# Patient Record
Sex: MALE | Race: ASIAN | NOT HISPANIC OR LATINO | URBAN - METROPOLITAN AREA
[De-identification: names, ages, dates, MRNs, and addresses within clinical notes are randomized per-mention and may not be internally consistent; named-entity substitution may affect disease eponyms.]

---

## 2023-01-19 ENCOUNTER — OFFICE VISIT (OUTPATIENT)
Dept: FAMILY MEDICINE | Facility: CLINIC | Age: 33
End: 2023-01-19
Payer: COMMERCIAL

## 2023-01-19 ENCOUNTER — TELEPHONE (OUTPATIENT)
Dept: FAMILY MEDICINE | Facility: CLINIC | Age: 33
End: 2023-01-19

## 2023-01-19 VITALS
SYSTOLIC BLOOD PRESSURE: 129 MMHG | BODY MASS INDEX: 25.18 KG/M2 | WEIGHT: 190 LBS | DIASTOLIC BLOOD PRESSURE: 89 MMHG | HEART RATE: 96 BPM | TEMPERATURE: 97.8 F | HEIGHT: 73 IN | OXYGEN SATURATION: 98 % | RESPIRATION RATE: 16 BRPM

## 2023-01-19 DIAGNOSIS — Z13.220 LIPID SCREENING: ICD-10-CM

## 2023-01-19 DIAGNOSIS — K21.9 GASTROESOPHAGEAL REFLUX DISEASE WITHOUT ESOPHAGITIS: ICD-10-CM

## 2023-01-19 DIAGNOSIS — K21.9 GASTROESOPHAGEAL REFLUX DISEASE WITHOUT ESOPHAGITIS: Primary | ICD-10-CM

## 2023-01-19 DIAGNOSIS — Z00.00 ROUTINE GENERAL MEDICAL EXAMINATION AT A HEALTH CARE FACILITY: Primary | ICD-10-CM

## 2023-01-19 DIAGNOSIS — Z13.29 SCREENING FOR THYROID DISORDER: ICD-10-CM

## 2023-01-19 PROCEDURE — 99385 PREV VISIT NEW AGE 18-39: CPT | Performed by: PHYSICIAN ASSISTANT

## 2023-01-19 PROCEDURE — 99213 OFFICE O/P EST LOW 20 MIN: CPT | Mod: 25 | Performed by: PHYSICIAN ASSISTANT

## 2023-01-19 RX ORDER — PANTOPRAZOLE SODIUM 40 MG/1
40 TABLET, DELAYED RELEASE ORAL DAILY
Qty: 30 TABLET | Refills: 1 | Status: SHIPPED | OUTPATIENT
Start: 2023-01-19 | End: 2023-01-20

## 2023-01-19 ASSESSMENT — ENCOUNTER SYMPTOMS
WEAKNESS: 0
DIARRHEA: 0
HEADACHES: 0
HEARTBURN: 1
ABDOMINAL PAIN: 0
FREQUENCY: 0
NERVOUS/ANXIOUS: 0
DIZZINESS: 0
NAUSEA: 0
DYSURIA: 0
COUGH: 0
HEMATURIA: 0
PALPITATIONS: 0
HEMATOCHEZIA: 1
JOINT SWELLING: 0
CONSTIPATION: 0
FEVER: 0
SORE THROAT: 0
ARTHRALGIAS: 0
SHORTNESS OF BREATH: 0
MYALGIAS: 0
PARESTHESIAS: 0
CHILLS: 0
EYE PAIN: 0

## 2023-01-19 ASSESSMENT — PAIN SCALES - GENERAL: PAINLEVEL: NO PAIN (0)

## 2023-01-19 NOTE — PROGRESS NOTES
SUBJECTIVE:   CC: Zeina is an 32 year old who presents for preventative health visit.     ASSESSMENT / PLAN:  (Z00.00) Routine general medical examination at a health care facility  (primary encounter diagnosis)  Comment: Completed  Plan: Continue following up yearly    (K21.9) Gastroesophageal reflux disease without esophagitis  Comment: New problem  Plan: pantoprazole (PROTONIX) 40 MG EC tablet   Increased discomfort after eating in stomach and chest which is worsened by spicy foods and when sedentary is consistent with reflux. Will treat with protonix and if not resolving will consider further testing               Patient has been advised of split billing requirements and indicates understanding: Yes  Healthy Habits:     Getting at least 3 servings of Calcium per day:  Yes    Bi-annual eye exam:  NO    Dental care twice a year:  NO    Sleep apnea or symptoms of sleep apnea:  Excessive snoring and Sleep apnea    Diet:  Regular (no restrictions)    Frequency of exercise:  None    Taking medications regularly:  Yes    Medication side effects:  Not applicable    PHQ-2 Total Score: 0    Additional concerns today:  Yes        Today's PHQ-2 Score:   PHQ-2 ( 1999 Pfizer) 1/19/2023   Q1: Little interest or pleasure in doing things 0   Q2: Feeling down, depressed or hopeless 0   PHQ-2 Score 0   Q1: Little interest or pleasure in doing things Not at all   Q2: Feeling down, depressed or hopeless Not at all   PHQ-2 Score 0       Have you ever done Advance Care Planning? (For example, a Health Directive, POLST, or a discussion with a medical provider or your loved ones about your wishes): No, advance care planning information given to patient to review.  Patient declined advance care planning discussion at this time.    Social History     Tobacco Use     Smoking status: Some Days     Types: Cigarettes     Smokeless tobacco: Never   Substance Use Topics     Alcohol use: Not on file     If you drink alcohol do you typically have  >3 drinks per day or >7 drinks per week? No    Alcohol Use 1/19/2023   Prescreen: >3 drinks/day or >7 drinks/week? No   No flowsheet data found.    Last PSA: No results found for: PSA    Reviewed orders with patient. Reviewed health maintenance and updated orders accordingly - Yes  Lab work is in process    Reviewed and updated as needed this visit by clinical staff   Tobacco  Allergies  Meds              Reviewed and updated as needed this visit by Provider                 History reviewed. No pertinent past medical history.   History reviewed. No pertinent surgical history.    Review of Systems   Constitutional: Negative for chills and fever.   HENT: Negative for congestion, ear pain, hearing loss and sore throat.    Eyes: Negative for pain and visual disturbance.   Respiratory: Negative for cough and shortness of breath.    Cardiovascular: Positive for chest pain. Negative for palpitations and peripheral edema.   Gastrointestinal: Positive for heartburn and hematochezia. Negative for abdominal pain, constipation, diarrhea and nausea.   Genitourinary: Negative for dysuria, frequency, genital sores, hematuria and urgency.   Musculoskeletal: Negative for arthralgias, joint swelling and myalgias.   Skin: Negative for rash.   Neurological: Negative for dizziness, weakness, headaches and paresthesias.   Psychiatric/Behavioral: Negative for mood changes. The patient is not nervous/anxious.      CONSTITUTIONAL: NEGATIVE for fever, chills, change in weight  INTEGUMENTARY/SKIN: NEGATIVE for worrisome rashes, moles or lesions  EYES: NEGATIVE for vision changes or irritation  ENT: NEGATIVE for ear, mouth and throat problems  RESP: NEGATIVE for significant cough or SOB  CV: NEGATIVE for chest pain, palpitations or peripheral edema  GI: NEGATIVE for nausea, abdominal pain, heartburn, or change in bowel habits   male: negative for dysuria, hematuria, decreased urinary stream, erectile dysfunction, urethral  "discharge  MUSCULOSKELETAL: NEGATIVE for significant arthralgias or myalgia  NEURO: NEGATIVE for weakness, dizziness or paresthesias  PSYCHIATRIC: NEGATIVE for changes in mood or affect    OBJECTIVE:   /89   Pulse 96   Temp 97.8  F (36.6  C) (Tympanic)   Resp 16   Ht 1.842 m (6' 0.5\")   Wt 86.2 kg (190 lb)   SpO2 98%   BMI 25.41 kg/m      Physical Exam  GENERAL: healthy, alert and no distress  EYES: Eyes grossly normal to inspection, PERRL and conjunctivae and sclerae normal  HENT: ear canals and TM's normal, nose and mouth without ulcers or lesions  NECK: no adenopathy, no asymmetry, masses, or scars and thyroid normal to palpation  RESP: lungs clear to auscultation - no rales, rhonchi or wheezes  CV: regular rate and rhythm, normal S1 S2, no S3 or S4, no murmur, click or rub, no peripheral edema and peripheral pulses strong  ABDOMEN: soft, nontender, no hepatosplenomegaly, no masses and bowel sounds normal          COUNSELING:   Reviewed preventive health counseling, as reflected in patient instructions       Regular exercise       Healthy diet/nutrition        He reports that he has been smoking cigarettes. He has never used smokeless tobacco.            CHANTELL MERRILL [facility]  "

## 2023-01-19 NOTE — TELEPHONE ENCOUNTER
Patient is calling to the clinic in regards to prescription below.      Disp Refills Start End ANGÉLICA   pantoprazole (PROTONIX) 40 MG EC tablet 30 tablet 1 1/19/2023  --   Sig - Route: Take 1 tablet (40 mg) by mouth daily - Oral   Sent to pharmacy as: Pantoprazole Sodium 40 MG Oral Tablet Delayed Release (PROTONIX)   Class: E-Prescribe   Order: [id number]   E-Prescribing Status: Receipt confirmed by pharmacy (1/19/2023 11:17 AM CST)     Patient states that he went to go  the prescription at the pharmacy and it was not covered.     Patient is wondering if an alternative medication can be sent.     Routing to provider to review and advise.     Lawanda Avalos RN, BSN  [facility]

## 2023-01-19 NOTE — NURSING NOTE
"Chief Complaint   Patient presents with     Physical       Initial /89   Pulse 96   Temp 97.8  F (36.6  C) (Tympanic)   Resp 16   Ht 1.842 m (6' 0.5\")   Wt 86.2 kg (190 lb)   SpO2 98%   BMI 25.41 kg/m   Estimated body mass index is 25.41 kg/m  as calculated from the following:    Height as of this encounter: 1.842 m (6' 0.5\").    Weight as of this encounter: 86.2 kg (190 lb).  Medication Reconciliation: complete    CANDACE Randle MA    "

## 2023-01-20 RX ORDER — OMEPRAZOLE 40 MG/1
40 CAPSULE, DELAYED RELEASE ORAL DAILY
Qty: 30 CAPSULE | Refills: 1 | Status: SHIPPED | OUTPATIENT
Start: 2023-01-20 | End: 2024-04-03

## 2023-01-20 NOTE — TELEPHONE ENCOUNTER
I sent in omeprazole which may be covered better. Still the same directions as before as a once daily medication    Shilo Kapadia, PAC

## 2023-01-20 NOTE — TELEPHONE ENCOUNTER
Pt notified of provider message as written.  Pt verbalized good understanding.  Hanna Hall BSN, RN

## 2023-01-26 ENCOUNTER — LAB (OUTPATIENT)
Dept: LAB | Facility: CLINIC | Age: 33
End: 2023-01-26
Payer: COMMERCIAL

## 2023-01-26 DIAGNOSIS — Z11.59 NEED FOR HEPATITIS C SCREENING TEST: ICD-10-CM

## 2023-01-26 DIAGNOSIS — Z11.4 SCREENING FOR HIV (HUMAN IMMUNODEFICIENCY VIRUS): ICD-10-CM

## 2023-01-26 DIAGNOSIS — Z13.220 LIPID SCREENING: ICD-10-CM

## 2023-01-26 DIAGNOSIS — Z00.00 ROUTINE GENERAL MEDICAL EXAMINATION AT A HEALTH CARE FACILITY: ICD-10-CM

## 2023-01-26 DIAGNOSIS — Z13.29 SCREENING FOR THYROID DISORDER: ICD-10-CM

## 2023-01-26 LAB
ANION GAP SERPL CALCULATED.3IONS-SCNC: 3 MMOL/L (ref 3–14)
BASOPHILS # BLD AUTO: 0 10E3/UL (ref 0–0.2)
BASOPHILS NFR BLD AUTO: 1 %
BUN SERPL-MCNC: 14 MG/DL (ref 7–30)
CALCIUM SERPL-MCNC: 9.2 MG/DL (ref 8.5–10.1)
CHLORIDE BLD-SCNC: 110 MMOL/L (ref 94–109)
CHOLEST SERPL-MCNC: 196 MG/DL
CO2 SERPL-SCNC: 29 MMOL/L (ref 20–32)
CREAT SERPL-MCNC: 0.77 MG/DL (ref 0.66–1.25)
EOSINOPHIL # BLD AUTO: 0.1 10E3/UL (ref 0–0.7)
EOSINOPHIL NFR BLD AUTO: 2 %
ERYTHROCYTE [DISTWIDTH] IN BLOOD BY AUTOMATED COUNT: 12.2 % (ref 10–15)
FASTING STATUS PATIENT QL REPORTED: YES
GFR SERPL CREATININE-BSD FRML MDRD: >90 ML/MIN/1.73M2
GLUCOSE BLD-MCNC: 108 MG/DL (ref 70–99)
HCT VFR BLD AUTO: 45.4 % (ref 40–53)
HDLC SERPL-MCNC: 57 MG/DL
HGB BLD-MCNC: 15.3 G/DL (ref 13.3–17.7)
LDLC SERPL CALC-MCNC: 118 MG/DL
LYMPHOCYTES # BLD AUTO: 2.6 10E3/UL (ref 0.8–5.3)
LYMPHOCYTES NFR BLD AUTO: 42 %
MCH RBC QN AUTO: 31.4 PG (ref 26.5–33)
MCHC RBC AUTO-ENTMCNC: 33.7 G/DL (ref 31.5–36.5)
MCV RBC AUTO: 93 FL (ref 78–100)
MONOCYTES # BLD AUTO: 0.5 10E3/UL (ref 0–1.3)
MONOCYTES NFR BLD AUTO: 8 %
NEUTROPHILS # BLD AUTO: 2.9 10E3/UL (ref 1.6–8.3)
NEUTROPHILS NFR BLD AUTO: 48 %
NONHDLC SERPL-MCNC: 139 MG/DL
PLATELET # BLD AUTO: 240 10E3/UL (ref 150–450)
POTASSIUM BLD-SCNC: 3.9 MMOL/L (ref 3.4–5.3)
RBC # BLD AUTO: 4.88 10E6/UL (ref 4.4–5.9)
SODIUM SERPL-SCNC: 142 MMOL/L (ref 133–144)
TRIGL SERPL-MCNC: 105 MG/DL
TSH SERPL DL<=0.005 MIU/L-ACNC: 0.83 MU/L (ref 0.4–4)
WBC # BLD AUTO: 6 10E3/UL (ref 4–11)

## 2023-01-26 PROCEDURE — 80061 LIPID PANEL: CPT

## 2023-01-26 PROCEDURE — 80048 BASIC METABOLIC PNL TOTAL CA: CPT

## 2023-01-26 PROCEDURE — 87389 HIV-1 AG W/HIV-1&-2 AB AG IA: CPT

## 2023-01-26 PROCEDURE — 85025 COMPLETE CBC W/AUTO DIFF WBC: CPT

## 2023-01-26 PROCEDURE — 84443 ASSAY THYROID STIM HORMONE: CPT

## 2023-01-26 PROCEDURE — 86803 HEPATITIS C AB TEST: CPT

## 2023-01-26 PROCEDURE — 36415 COLL VENOUS BLD VENIPUNCTURE: CPT

## 2023-01-27 LAB
HCV AB SERPL QL IA: NONREACTIVE
HIV 1+2 AB+HIV1 P24 AG SERPL QL IA: NONREACTIVE

## 2023-02-12 ENCOUNTER — HEALTH MAINTENANCE LETTER (OUTPATIENT)
Age: 33
End: 2023-02-12

## 2024-01-04 ENCOUNTER — PATIENT OUTREACH (OUTPATIENT)
Dept: CARE COORDINATION | Facility: CLINIC | Age: 34
End: 2024-01-04
Payer: COMMERCIAL

## 2024-01-18 ENCOUNTER — PATIENT OUTREACH (OUTPATIENT)
Dept: CARE COORDINATION | Facility: CLINIC | Age: 34
End: 2024-01-18
Payer: COMMERCIAL

## 2024-01-22 ENCOUNTER — OFFICE VISIT (OUTPATIENT)
Dept: FAMILY MEDICINE | Facility: CLINIC | Age: 34
End: 2024-01-22
Payer: COMMERCIAL

## 2024-01-22 ENCOUNTER — ANCILLARY PROCEDURE (OUTPATIENT)
Dept: GENERAL RADIOLOGY | Facility: CLINIC | Age: 34
End: 2024-01-22
Attending: PHYSICIAN ASSISTANT
Payer: COMMERCIAL

## 2024-01-22 VITALS
HEIGHT: 72 IN | OXYGEN SATURATION: 98 % | DIASTOLIC BLOOD PRESSURE: 72 MMHG | HEART RATE: 71 BPM | TEMPERATURE: 97.7 F | WEIGHT: 171 LBS | BODY MASS INDEX: 23.16 KG/M2 | SYSTOLIC BLOOD PRESSURE: 122 MMHG | RESPIRATION RATE: 16 BRPM

## 2024-01-22 DIAGNOSIS — R22.31 NODULE OF FINGER OF RIGHT HAND: ICD-10-CM

## 2024-01-22 DIAGNOSIS — R14.2 ERUCTATION: Primary | ICD-10-CM

## 2024-01-22 PROCEDURE — 99213 OFFICE O/P EST LOW 20 MIN: CPT | Performed by: PHYSICIAN ASSISTANT

## 2024-01-22 PROCEDURE — 73140 X-RAY EXAM OF FINGER(S): CPT | Mod: TC | Performed by: RADIOLOGY

## 2024-01-22 ASSESSMENT — ENCOUNTER SYMPTOMS
PARESTHESIAS: 0
HEADACHES: 0
SHORTNESS OF BREATH: 0
FEVER: 0
COUGH: 0
CONSTIPATION: 0
ABDOMINAL PAIN: 0
HEARTBURN: 1
HEMATURIA: 0
FREQUENCY: 0
MYALGIAS: 0
WEAKNESS: 0
DIARRHEA: 0
JOINT SWELLING: 0
HEMATOCHEZIA: 0
EYE PAIN: 0
DYSURIA: 0
NAUSEA: 0
NERVOUS/ANXIOUS: 0
SORE THROAT: 0
CHILLS: 0
ARTHRALGIAS: 0
DIZZINESS: 0
PALPITATIONS: 0

## 2024-01-22 ASSESSMENT — PAIN SCALES - GENERAL: PAINLEVEL: NO PAIN (1)

## 2024-01-22 NOTE — PROGRESS NOTES
"Preventive Care Visit  [facility]  YOEL MCDERMOTT PA-C, Physician Assistant - Medical  Jan 22, 2024      SUBJECTIVE:   eZina is a 33 year old, presenting for the following:  Physical (Discuss Stomach issues, and knot on right hand finger )        1/22/2024     1:51 PM   Additional Questions   Roomed by Anita   Accompanied by n/a     Zeina is an extremely pleasant 33 year old male who presents to clinic for evaluation of a couple issues.    1)  He has been having trouble with excessive burping and sour taste after the burp since before his check up last year. He was seen by [facility] and had some blood, stool testing that was normal. He was given a pill to try. The pill did decrease the sourness, but not the burping. He reports that the burping is not as bothersome to him as it is to his wife and other family members. When he eats, he feels like something is getting stuck at times, then feels a lot of air build up and then needs to burp. He describes it as \"really loud.\"  When he feels that something might be stuck, he stands up and drinks something to get it to move. Most of the time, it is meat. Once he felt like he was choking.     2) The other issue is that he has a bump on the palmar aspect of his right ring finger. It has been there for a couple years, has not grown and is not especially painful....unless he is grasping something. Then, it is described as \"bothersome, but not painful.\" It does, however, cause him to drop things at times because it takes him by surprise. He does work with his fingers a lot, rolling sushi.     He has no other concerns for today's visit. He is not interested in any vaccines today and he does not need any labs drawn.     Healthy Habits:     Getting at least 3 servings of Calcium per day:  Yes    Bi-annual eye exam:  NO    Dental care twice a year:  NO    Sleep apnea or symptoms of sleep apnea:  Excessive snoring    Diet:  Regular (no restrictions)    Frequency of " "exercise:  None    Taking medications regularly:  Yes    Medication side effects:  None    Additional concerns today:  Yes      Today's PHQ-2 Score:       1/22/2024     1:46 PM   PHQ-2 ( 1999 Pfizer)   Q1: Little interest or pleasure in doing things 1   Q2: Feeling down, depressed or hopeless 0   PHQ-2 Score 1   Q1: Little interest or pleasure in doing things Several days   Q2: Feeling down, depressed or hopeless Not at all   PHQ-2 Score 1       Social History     Tobacco Use    Smoking status: Some Days     Types: Cigarettes    Smokeless tobacco: Never   Substance Use Topics    Alcohol use: Not on file             1/22/2024     1:45 PM   Alcohol Use   Prescreen: >3 drinks/day or >7 drinks/week? No       Last PSA: No results found for: \"PSA\"    Reviewed orders with patient. Reviewed health maintenance and updated orders accordingly - Yes    Reviewed and updated as needed this visit by clinical staff   Tobacco  Allergies               Reviewed and updated as needed this visit by Provider                    Review of Systems   Constitutional:  Negative for chills and fever.   HENT:  Negative for congestion, ear pain, hearing loss and sore throat.    Eyes:  Negative for pain and visual disturbance.   Respiratory:  Negative for cough and shortness of breath.    Cardiovascular:  Negative for chest pain and palpitations.   Gastrointestinal:  Negative for abdominal pain, constipation, diarrhea and nausea.   Genitourinary:  Negative for dysuria, frequency, genital sores, hematuria and urgency.   Musculoskeletal:  Negative for arthralgias, joint swelling and myalgias.   Skin:  Negative for rash.   Neurological:  Negative for dizziness, weakness and headaches.   Psychiatric/Behavioral:  The patient is not nervous/anxious.          OBJECTIVE:   /72   Pulse 71   Temp 97.7  F (36.5  C) (Tympanic)   Resp 16   Ht 1.816 m (5' 11.5\")   Wt 77.6 kg (171 lb)   SpO2 98%   BMI 23.52 kg/m     Estimated body mass index is " "23.52 kg/m  as calculated from the following:    Height as of this encounter: 1.816 m (5' 11.5\").    Weight as of this encounter: 77.6 kg (171 lb).  Physical Exam  Constitutional:       Appearance: Normal appearance. He is normal weight. He is not ill-appearing.   HENT:      Mouth/Throat:      Mouth: Mucous membranes are moist.      Pharynx: No oropharyngeal exudate or posterior oropharyngeal erythema.   Cardiovascular:      Rate and Rhythm: Normal rate and regular rhythm.      Heart sounds: No murmur heard.  Pulmonary:      Effort: Pulmonary effort is normal.      Breath sounds: Normal breath sounds.   Abdominal:      General: Abdomen is flat. Bowel sounds are normal. There is no distension.      Palpations: Abdomen is soft. There is no mass.      Tenderness: There is no abdominal tenderness. There is no guarding or rebound.      Hernia: No hernia is present.   Musculoskeletal:      Cervical back: Normal range of motion. No rigidity or tenderness.   Lymphadenopathy:      Cervical: No cervical adenopathy.   Skin:     General: Skin is warm and dry.   Neurological:      Mental Status: He is alert and oriented to person, place, and time.   Psychiatric:         Mood and Affect: Mood normal.         Behavior: Behavior normal.         Thought Content: Thought content normal.         Judgment: Judgment normal.         ASSESSMENT/PLAN:   Eructation  Ddx includes but not limited to: esophageal stricture, hiatal hernia, chewing/swallowing mechanism dysfunction.   ---recommend returning to GI for further evaluation, possible EGD, possible swallow study, possible referral to SLP for swallow treatment. He is not ready to do this now due to scheduling issues. He will think about how he would like to proceed and let me know. In the interim, I have asked him to count to 20 while chewing and get all 20 chews in before swallowing to see if he can cut back on the symptoms. He is no longer taking a PPI and doing well without it for now " on the sour taste.     Nodule of finger of right hand  Likely a ganglion cyst. Discussed that there is a possible surgical intervention that might be helpful but often the lump will recur in time. I did do x-rays to verify there is not a bone problem or more sinister issue. I will send referral to hand surgeon for evaluation and treatment.   - XR Finger Right G/E 2 Views      He reports that he has been smoking cigarettes. He has never used smokeless tobacco.  Nicotine/Tobacco Cessation Plan  Not ready to quit yet. Smokes socially.             Signed Electronically by: YOEL MCDERMOTT PA-C  Answers submitted by the patient for this visit:  Annual Preventive Visit (Submitted on 1/22/2024)  Chief Complaint: Annual Exam:  Blood in stool: No  heartburn: Yes  peripheral edema: No  mood changes: No  Skin sensation changes: No

## 2024-01-24 ENCOUNTER — TELEPHONE (OUTPATIENT)
Dept: FAMILY MEDICINE | Facility: CLINIC | Age: 34
End: 2024-01-24
Payer: COMMERCIAL

## 2024-01-24 DIAGNOSIS — R22.31 NODULE OF FINGER OF RIGHT HAND: Primary | ICD-10-CM

## 2024-01-24 NOTE — TELEPHONE ENCOUNTER
Order/Referral Request    Who is requesting: Patient    Orders being requested: Referral for hand specialist    Reason service is needed/diagnosis: finger injury    When are orders needed by: asap    Has this been discussed with Provider: Yes    Does patient have a preference on a Group/Provider/Facility? Within [facility]  Does patient have an appointment scheduled?: No    Where to send orders: Place orders within Epic    Could we send this information to you in [facility] or would you prefer to receive a phone call?:   Patient would prefer a phone call   Okay to leave a detailed message?: Yes at Cell number on file:    Telephone Information:   Mobile [phone]

## 2024-03-01 ENCOUNTER — OFFICE VISIT (OUTPATIENT)
Dept: FAMILY MEDICINE | Facility: CLINIC | Age: 34
End: 2024-03-01
Payer: COMMERCIAL

## 2024-03-01 VITALS
WEIGHT: 168 LBS | TEMPERATURE: 98.7 F | RESPIRATION RATE: 12 BRPM | OXYGEN SATURATION: 97 % | BODY MASS INDEX: 22.75 KG/M2 | HEART RATE: 75 BPM | HEIGHT: 72 IN | DIASTOLIC BLOOD PRESSURE: 83 MMHG | SYSTOLIC BLOOD PRESSURE: 135 MMHG

## 2024-03-01 DIAGNOSIS — L65.9 ALOPECIA: Primary | ICD-10-CM

## 2024-03-01 LAB
BASOPHILS # BLD AUTO: 0 10E3/UL (ref 0–0.2)
BASOPHILS NFR BLD AUTO: 1 %
EOSINOPHIL # BLD AUTO: 0.1 10E3/UL (ref 0–0.7)
EOSINOPHIL NFR BLD AUTO: 1 %
ERYTHROCYTE [DISTWIDTH] IN BLOOD BY AUTOMATED COUNT: 11.8 % (ref 10–15)
HCT VFR BLD AUTO: 45.3 % (ref 40–53)
HGB BLD-MCNC: 14.9 G/DL (ref 13.3–17.7)
IMM GRANULOCYTES # BLD: 0 10E3/UL
IMM GRANULOCYTES NFR BLD: 0 %
LYMPHOCYTES # BLD AUTO: 2.3 10E3/UL (ref 0.8–5.3)
LYMPHOCYTES NFR BLD AUTO: 37 %
MCH RBC QN AUTO: 31.3 PG (ref 26.5–33)
MCHC RBC AUTO-ENTMCNC: 32.9 G/DL (ref 31.5–36.5)
MCV RBC AUTO: 95 FL (ref 78–100)
MONOCYTES # BLD AUTO: 0.4 10E3/UL (ref 0–1.3)
MONOCYTES NFR BLD AUTO: 6 %
NEUTROPHILS # BLD AUTO: 3.4 10E3/UL (ref 1.6–8.3)
NEUTROPHILS NFR BLD AUTO: 55 %
PLATELET # BLD AUTO: 234 10E3/UL (ref 150–450)
RBC # BLD AUTO: 4.76 10E6/UL (ref 4.4–5.9)
WBC # BLD AUTO: 6.3 10E3/UL (ref 4–11)

## 2024-03-01 PROCEDURE — 83540 ASSAY OF IRON: CPT | Performed by: PHYSICIAN ASSISTANT

## 2024-03-01 PROCEDURE — 86780 TREPONEMA PALLIDUM: CPT | Performed by: PHYSICIAN ASSISTANT

## 2024-03-01 PROCEDURE — 82728 ASSAY OF FERRITIN: CPT | Performed by: PHYSICIAN ASSISTANT

## 2024-03-01 PROCEDURE — 84443 ASSAY THYROID STIM HORMONE: CPT | Performed by: PHYSICIAN ASSISTANT

## 2024-03-01 PROCEDURE — 85025 COMPLETE CBC W/AUTO DIFF WBC: CPT | Performed by: PHYSICIAN ASSISTANT

## 2024-03-01 PROCEDURE — 36415 COLL VENOUS BLD VENIPUNCTURE: CPT | Performed by: PHYSICIAN ASSISTANT

## 2024-03-01 PROCEDURE — 99213 OFFICE O/P EST LOW 20 MIN: CPT | Performed by: PHYSICIAN ASSISTANT

## 2024-03-01 PROCEDURE — 83550 IRON BINDING TEST: CPT | Performed by: PHYSICIAN ASSISTANT

## 2024-03-01 NOTE — PROGRESS NOTES
"  Assessment & Plan     Alopecia  Will run some lab tests, but sent referral to Derm for further evaluation.   - Adult Dermatology  Referral; Future  - CBC with platelets and differential; Future  - TSH with free T4 reflex; Future  - Treponema Abs w Reflex to RPR and Titer; Future  - Iron and iron binding capacity; Future  - Ferritin; Future  - CBC with platelets and differential  - TSH with free T4 reflex  - Treponema Abs w Reflex to RPR and Titer  - Iron and iron binding capacity  - Ferritin    Win Pastrana is a 33 year old, presenting for the following health issues:  bald spots (Per pt it has been expanding quickly. )        3/1/2024     2:41 PM   Additional Questions   Roomed by Anita   Accompanied by Self         3/1/2024     2:41 PM   Patient Reported Additional Medications   Patient reports taking the following new medications n/a     Zeina is a very pleasant 33 year old male who presents to clinic for evaluation of sudden onset bald spots on his head. He says the right one has been expanding for about a year, but the left one just was noticed a month ago. No pain. No changes in products. Wife cuts his hair (and says she did not cut it like that but was the one who noticed it).     History of Present Illness       Reason for visit:  Bald spots on head  Symptom onset:  More than a month    He eats 0-1 servings of fruits and vegetables daily.He consumes 1 sweetened beverage(s) daily.He exercises with enough effort to increase his heart rate 9 or less minutes per day.  He exercises with enough effort to increase his heart rate 3 or less days per week.   He is taking medications regularly.     ROS: see HPI      Objective    /83   Pulse 75   Temp 98.7  F (37.1  C) (Tympanic)   Resp 12   Ht 1.816 m (5' 11.5\")   Wt 76.2 kg (168 lb)   SpO2 97%   BMI 23.10 kg/m    Body mass index is 23.1 kg/m .  Physical Exam   Vitals reviewed  Hair: two large circular bald spots (one on the right posterior " and one on the left) scalp. Appears to have some hair follicles. The one on the right has a small pink central lesion but no evidence of abscess.   Psych: appropriate behavior, mood and thought content.     Signed Electronically by: YOEL MCDERMOTT PA-C

## 2024-03-02 LAB
FERRITIN SERPL-MCNC: 423 NG/ML (ref 31–409)
IRON BINDING CAPACITY (ROCHE): 308 UG/DL (ref 240–430)
IRON SATN MFR SERPL: 35 % (ref 15–46)
IRON SERPL-MCNC: 108 UG/DL (ref 61–157)
T PALLIDUM AB SER QL: NONREACTIVE
TSH SERPL DL<=0.005 MIU/L-ACNC: 1.09 UIU/ML (ref 0.3–4.2)

## 2024-03-07 ENCOUNTER — OFFICE VISIT (OUTPATIENT)
Dept: ORTHOPEDICS | Facility: CLINIC | Age: 34
End: 2024-03-07
Payer: COMMERCIAL

## 2024-03-07 VITALS
HEART RATE: 70 BPM | WEIGHT: 168 LBS | SYSTOLIC BLOOD PRESSURE: 137 MMHG | BODY MASS INDEX: 23.1 KG/M2 | DIASTOLIC BLOOD PRESSURE: 87 MMHG

## 2024-03-07 DIAGNOSIS — R22.31 NODULE OF FINGER OF RIGHT HAND: ICD-10-CM

## 2024-03-07 PROCEDURE — 99243 OFF/OP CNSLTJ NEW/EST LOW 30: CPT | Mod: 25 | Performed by: FAMILY MEDICINE

## 2024-03-07 PROCEDURE — 20604 DRAIN/INJ JOINT/BURSA W/US: CPT | Mod: RT | Performed by: FAMILY MEDICINE

## 2024-03-07 NOTE — LETTER
3/7/2024         RE: Zeina Elaine  [address]  [place] [address]        Dear Colleague,    Thank you for referring your patient, Zeina Elaine, to the [facility]. Please see a copy of my visit note below.    ASSESSMENT & PLAN    Zeina was seen today for pain.    Diagnoses and all orders for this visit:    Nodule of finger of right hand  -     Orthopedic  Referral  -     MR Finger Right w/o & w Contrast; Future      This issue is acute on chronic and Worsening.    # Right 4th Digit Mass: Zeina Elaine  was seen today for right 4th digit mass. Symptoms had been going on for years, with pain affecting his ability to work. On examination there are positive findings of swelling over radial side of right 4th PIP joint with pain with palpation. Imaging findings showed negative right fourth digit finger x-ray, ultrasound showing hypoechoic mass associated with the flexor tendon.  Aspiration attempt today was unsuccessful there is a concern this may be a benign tumor causing his symptoms versus a ganglion cyst.  Given this plan to get right fourth digit MRI and have patient follow-up in clinic afterwards to go over the results.     Image Findings: Negative right fourth digit x-ray, ultrasound showing mass adjacent to the fourth PIP flexor tendon  Treatment: Activities as tolerated, right fourth digit MRI ordered with and without contrast  Medications/Injections: Limited tylenol/ibuprofen for pain for 1-2 weeks, Topical Voltaren gel, unsuccessful aspiration attempt of fourth digit mass  Follow-up: In clinic afterwards to go over the results      Artem Garcia MD  [facility]    -----  Chief Complaint   Patient presents with     Right Ring Finger - Pain       SUBJECTIVE  Zeina Elaine is a/an 33 year old male who is seen in consultation at the request of  Basilia Castellano PA-C for evaluation of right ring finger pain. Reviewed PCP  notes.    The patient is seen by themselves.  The patient is Right handed    Onset: 3-4 years(s) ago. Reports insidious onset without acute precipitating event.  Location of Pain: Right ring finger PIP, volar side   Worsened by: when he squeezes and object   Better with: nothing   Treatments tried: no treatment tried to date  Associated symptoms: bump     Orthopedic/Surgical history: NO  Social History/Occupation:      No family history pertinent to patient's problem today.     REVIEW OF SYSTEMS:  Review of Systems  Constitutional, HEENT, cardiovascular, pulmonary, gi and gu systems are negative, except as otherwise noted.    OBJECTIVE:  /87   Pulse 70   Wt 76.2 kg (168 lb)   BMI 23.10 kg/m     General: healthy, alert and in no distress  HEENT: no scleral icterus or conjunctival erythema  Skin: no suspicious lesions or rash. No jaundice.  CV: distal perfusion intact    Resp: normal respiratory effort without conversational dyspnea   Psych: normal mood and affect  Gait: normal steady gait with appropriate coordination and balance    Neuro: Normal light sensory exam of right upper extremity     Ortho Exam   RIGHT HAND  Inspection:  Swelling over 4th digit volar radial PIP joint  Palpation:   Carpals: normal   Metacarpals: normal   Thumb: normal   Fingers: normal  Range of Motion:    Full active flexion and extension at MCP, PIP, and DIP joints; normal finger cascade without malrotation.  Wrist pronation, supination, and ulnar/radial deviation normal.  Strength:     full     RADIOLOGY:  I independently ordered, visualized and reviewed these images with the patient  Right 4th digit x-rays reviewed      Review of external notes as documented elsewhere in note  Review of the result(s) of each unique test - right 4th digit x-rays       Disclaimer: This note consists of symbols derived from keyboarding, dictation and/or voice recognition software. As a result, there may be errors in the script that have  gone undetected. Please consider this when interpreting information found in this chart.    Small Joint Injection/Arthrocentesis: R ring PIP    Date/Time: 3/14/2024 9:08 AM    Performed by: Artem Garcia MD  Authorized by: Artem Garcia MD  Indications:  Pain  Needle Size:  18 G  Guidance: ultrasound     Approach:  Volar  Location:  Ring finger    Site:  R ring PIP                    Medications:  0.5 mL ROPivacaine 5 MG/ML          Procedure discussed: discussed risks, benefits, and alternatives    Consent Given by:  Patient  Timeout: timeout called immediately prior to procedure    Prep: patient was prepped and draped in usual sterile fashion       Unsuccessful ultrasound-guided aspiration attempt of mass over right fourth PIP joint on the volar side.  Ultrasound guided images were permanently stored.   Aftercare instructions given to patient.  Plan to follow-up as discussed above.     Artem Garcia MD [facility]              Again, thank you for allowing me to participate in the care of your patient.        Sincerely,        Artem Garcia MD

## 2024-03-07 NOTE — PROGRESS NOTES
ASSESSMENT & PLAN    Zeina was seen today for pain.    Diagnoses and all orders for this visit:    Nodule of finger of right hand  -     Orthopedic  Referral  -     MR Finger Right w/o & w Contrast; Future      This issue is acute on chronic and Worsening.    # Right 4th Digit Mass: Zeina Elaine  was seen today for right 4th digit mass. Symptoms had been going on for years, with pain affecting his ability to work. On examination there are positive findings of swelling over radial side of right 4th PIP joint with pain with palpation. Imaging findings showed negative right fourth digit finger x-ray, ultrasound showing hypoechoic mass associated with the flexor tendon.  Aspiration attempt today was unsuccessful there is a concern this may be a benign tumor causing his symptoms versus a ganglion cyst.  Given this plan to get right fourth digit MRI and have patient follow-up in clinic afterwards to go over the results.     Image Findings: Negative right fourth digit x-ray, ultrasound showing mass adjacent to the fourth PIP flexor tendon  Treatment: Activities as tolerated, right fourth digit MRI ordered with and without contrast  Medications/Injections: Limited tylenol/ibuprofen for pain for 1-2 weeks, Topical Voltaren gel, unsuccessful aspiration attempt of fourth digit mass  Follow-up: In clinic afterwards to go over the results      Artem Garcia MD  [facility] [place]    -----  Chief Complaint   Patient presents with    Right Ring Finger - Pain       SUBJECTIVE  Zeina Elaine is a/an 33 year old male who is seen in consultation at the request of  Basilia Castellano PA-C for evaluation of right ring finger pain. Reviewed PCP notes.    The patient is seen by themselves.  The patient is Right handed    Onset: 3-4 years(s) ago. Reports insidious onset without acute precipitating event.  Location of Pain: Right ring finger PIP, volar side   Worsened by: when he squeezes and object    Better with: nothing   Treatments tried: no treatment tried to date  Associated symptoms: bump     Orthopedic/Surgical history: NO  Social History/Occupation:      No family history pertinent to patient's problem today.     REVIEW OF SYSTEMS:  Review of Systems  Constitutional, HEENT, cardiovascular, pulmonary, gi and gu systems are negative, except as otherwise noted.    OBJECTIVE:  /87   Pulse 70   Wt 76.2 kg (168 lb)   BMI 23.10 kg/m     General: healthy, alert and in no distress  HEENT: no scleral icterus or conjunctival erythema  Skin: no suspicious lesions or rash. No jaundice.  CV: distal perfusion intact    Resp: normal respiratory effort without conversational dyspnea   Psych: normal mood and affect  Gait: normal steady gait with appropriate coordination and balance    Neuro: Normal light sensory exam of right upper extremity     Ortho Exam   RIGHT HAND  Inspection:  Swelling over 4th digit volar radial PIP joint  Palpation:   Carpals: normal   Metacarpals: normal   Thumb: normal   Fingers: normal  Range of Motion:    Full active flexion and extension at MCP, PIP, and DIP joints; normal finger cascade without malrotation.  Wrist pronation, supination, and ulnar/radial deviation normal.  Strength:     full     RADIOLOGY:  I independently ordered, visualized and reviewed these images with the patient  Right 4th digit x-rays reviewed      Review of external notes as documented elsewhere in note  Review of the result(s) of each unique test - right 4th digit x-rays       Disclaimer: This note consists of symbols derived from keyboarding, dictation and/or voice recognition software. As a result, there may be errors in the script that have gone undetected. Please consider this when interpreting information found in this chart.    Small Joint Injection/Arthrocentesis: R ring PIP    Date/Time: 3/14/2024 9:08 AM    Performed by: Artem Garcia MD  Authorized by: Artem Garcia MD   Indications:  Pain  Needle Size:  18 G  Guidance: ultrasound     Approach:  Volar  Location:  Ring finger    Site:  R ring PIP                    Medications:  0.5 mL ROPivacaine 5 MG/ML          Procedure discussed: discussed risks, benefits, and alternatives    Consent Given by:  Patient  Timeout: timeout called immediately prior to procedure    Prep: patient was prepped and draped in usual sterile fashion       Unsuccessful ultrasound-guided aspiration attempt of mass over right fourth PIP joint on the volar side.  Ultrasound guided images were permanently stored.   Aftercare instructions given to patient.  Plan to follow-up as discussed above.     Artem Garcia MD [facility]

## 2024-03-07 NOTE — PATIENT INSTRUCTIONS
# Right 4th Digit Mass: Zeina Elaine  was seen today for right 4th digit mass. Symptoms had been going on for years, with pain affecting his ability to work. On examination there are positive findings of swelling over radial side of right 4th PIP joint with pain with palpation. Imaging findings showed negative right fourth digit finger x-ray, ultrasound showing hypoechoic mass associated with the flexor tendon.  Aspiration attempt today was unsuccessful there is a concern this may be a benign tumor causing his symptoms versus a ganglion cyst.  Given this plan to get right fourth digit MRI and have patient follow-up in clinic afterwards to go over the results.     Image Findings: Negative right fourth digit x-ray, ultrasound showing mass adjacent to the fourth PIP flexor tendon  Treatment: Activities as tolerated, right fourth digit MRI ordered with and without contrast  Medications/Injections: Limited tylenol/ibuprofen for pain for 1-2 weeks, Topical Voltaren gel, unsuccessful aspiration attempt of fourth digit mass  Follow-up: In clinic afterwards to go over the results    Please call [phone]   Ask for my team if you have any questions or concerns    If you have not yet received the influenza vaccine but would like to get one, please call  [phone] or you can schedule via [employer]    It was great seeing you today!    Artem Garcia MD, CAQSM     FS Injection Discharge Instructions    Procedure: right 4th digit aspiration attempt unsuccessful    You may shower, however avoid swimming, tub baths or hot tubs for 24 hours following your procedure  You may have a mild to moderate increase in pain for several days following the injection.  It may take up to 14 days for the steroid medication to start working although you may feel the effect as early as a few days after the procedure.  You may use ice packs for 10-15 minutes, 3 to 4 times a day at the injection site for comfort  You may use  anti-inflammatory medications (such as Ibuprofen or Aleve or Advil) or Tylenol for pain control if necessary  If you were fasting, you may resume your normal diet and medications after the procedure  If you have diabetes, check your blood sugar more frequently than usual as your blood sugar may be higher than normal for 10-14 days following a steroid injection. Contact your doctor who manages your diabetes if your blood sugar is higher than usual    If you experience any of the following, call [facility] @ [phone] or [phone]  -Fever over 100 degree F  -Swelling, bleeding, redness, drainage, warmth at the injection site  - New or worsening pain

## 2024-03-10 ENCOUNTER — HEALTH MAINTENANCE LETTER (OUTPATIENT)
Age: 34
End: 2024-03-10

## 2024-03-14 RX ORDER — ROPIVACAINE HYDROCHLORIDE 5 MG/ML
0.5 INJECTION, SOLUTION EPIDURAL; INFILTRATION; PERINEURAL
Status: SHIPPED | OUTPATIENT
Start: 2024-03-14

## 2024-03-14 RX ADMIN — ROPIVACAINE HYDROCHLORIDE 0.5 ML: 5 INJECTION, SOLUTION EPIDURAL; INFILTRATION; PERINEURAL at 09:08

## 2024-03-21 ENCOUNTER — ANCILLARY PROCEDURE (OUTPATIENT)
Dept: MRI IMAGING | Facility: CLINIC | Age: 34
End: 2024-03-21
Attending: FAMILY MEDICINE
Payer: COMMERCIAL

## 2024-03-21 DIAGNOSIS — R22.31 NODULE OF FINGER OF RIGHT HAND: ICD-10-CM

## 2024-03-21 PROCEDURE — 73223 MRI JOINT UPR EXTR W/O&W/DYE: CPT | Mod: RT | Performed by: RADIOLOGY

## 2024-03-21 PROCEDURE — A9585 GADOBUTROL INJECTION: HCPCS | Performed by: RADIOLOGY

## 2024-03-21 RX ORDER — GADOBUTROL 604.72 MG/ML
7.5 INJECTION INTRAVENOUS ONCE
Status: COMPLETED | OUTPATIENT
Start: 2024-03-21 | End: 2024-03-21

## 2024-03-21 RX ADMIN — GADOBUTROL 7.5 ML: 604.72 INJECTION INTRAVENOUS at 10:19

## 2024-03-26 ENCOUNTER — OFFICE VISIT (OUTPATIENT)
Dept: ORTHOPEDICS | Facility: CLINIC | Age: 34
End: 2024-03-26
Payer: COMMERCIAL

## 2024-03-26 VITALS — BODY MASS INDEX: 23.1 KG/M2 | HEIGHT: 72 IN

## 2024-03-26 DIAGNOSIS — R22.31 NODULE OF FINGER OF RIGHT HAND: Primary | ICD-10-CM

## 2024-03-26 PROCEDURE — 99213 OFFICE O/P EST LOW 20 MIN: CPT | Performed by: FAMILY MEDICINE

## 2024-03-26 ASSESSMENT — PAIN SCALES - GENERAL: PAINLEVEL: MODERATE PAIN (5)

## 2024-03-26 NOTE — PATIENT INSTRUCTIONS
# Right 4th Digit Mass: Zeina Elaine  was seen today for right 4th digit mass. Symptoms had been going on for years, with pain affecting his ability to work. On examination there are positive findings of swelling over radial side of right 4th PIP joint with pain with palpation. Imaging findings showed negative right fourth digit finger x-ray, ultrasound showing hypoechoic mass not associated with blood vessel. Previous aspiration attempt unsuccessful.  Reviewed MRI with contrast concerning for giant cell tumor vs vascular malformation but no blood flow seen on repeat ultrasound. Given symptoms are still affecting patients ability to work, plan as below.     Image Findings: Negative right fourth digit x-ray, ultrasound showing mass with no blood flow, Reviewed MRI showing concern for possible giant cell tumor.  Treatment: Activities as tolerated, referral to hand surgeon placed  Medications/Injections: Limited tylenol/ibuprofen for pain for 1-2 weeks, Topical Voltaren gel, none today  Follow-up: Hand surgeon, order placed today    Please call [phone]   Ask for my team if you have any questions or concerns    If you have not yet received the influenza vaccine but would like to get one, please call  [phone] or you can schedule via [employer]    It was great seeing you again today!    Artem Garcia MD, [facility]

## 2024-03-26 NOTE — PROGRESS NOTES
ASSESSMENT & PLAN    Zeina was seen today for pain.    Diagnoses and all orders for this visit:    Nodule of finger of right hand  -     Orthopedic  Referral; Future        # Right 4th Digit Mass: Zeina Elaine  was seen today for right 4th digit mass. Symptoms had been going on for years, with pain affecting his ability to work. On examination there are positive findings of swelling over radial side of right 4th PIP joint with pain with palpation. Imaging findings showed negative right fourth digit finger x-ray, ultrasound showing hypoechoic mass not associated with blood vessel. Previous aspiration attempt unsuccessful.  Reviewed MRI with contrast concerning for giant cell tumor vs vascular malformation but no blood flow seen on repeat ultrasound. Given symptoms are still affecting patients ability to work, plan as below.     Image Findings: Negative right fourth digit x-ray, ultrasound showing mass with no blood flow, Reviewed MRI showing concern for possible giant cell tumor.  Treatment: Activities as tolerated, referral to hand surgeon placed  Medications/Injections: Limited tylenol/ibuprofen for pain for 1-2 weeks, Topical Voltaren gel, none today  Follow-up: Hand surgeon, order placed today    -----    SUBJECTIVE:  Zeina Elaine is a 33 year old male who is seen in follow-up for right finger mass. They were last seen 3/7/2024 and right right PIP aspiration was attempted 3/7/24.  The patient is seen by themselves.    Since their last visit reports worsening finger pain.  Would like to review MRI They indicate that their current pain level is 5/10. They have tried an attempted aspiration 3/7/24.        Patient's past medical, surgical, social, and family histories were reviewed today and no changes are noted.    REVIEW OF SYSTEMS:  Constitutional: NEGATIVE for fever, chills, change in weight  Skin: NEGATIVE for worrisome rashes, moles or lesions  GI/: NEGATIVE for bowel or bladder changes  Neuro: NEGATIVE  "for weakness, dizziness or paresthesias    OBJECTIVE:  Ht 1.816 m (5' 11.5\")   BMI 23.10 kg/m     General: healthy, alert and in no distress  HEENT: no scleral icterus or conjunctival erythema  Skin: no suspicious lesions or rash. No jaundice.  CV: regular rhythm by palpation, no pedal edema  Resp: normal respiratory effort without conversational dyspnea   Psych: normal mood and affect  Gait: normal steady gait with appropriate coordination and balance  Neuro: normal light touch sensory exam of the extremities.    MSK:    Ortho Exam   RIGHT HAND  Inspection:  Mild swelling over 4th digit volar radial PIP joint  Palpation:   Carpals: normal   Metacarpals: normal   Thumb: normal   Fingers: normal  Range of Motion:    Full active flexion and extension at MCP, PIP, and DIP joints; normal finger cascade without malrotation.  Wrist pronation, supination, and ulnar/radial deviation normal.  Strength:     full     Independent visualization of the below image:  Results for orders placed or performed in visit on 03/21/24   MR Finger Right w/o & w Contrast    Narrative    MR right fourth finger without and with contrast 3/21/2024 10:34 AM    Techniques: Multiplanar multisequence imaging of the right fourth  finger was obtained before and after administration of intravenous  contrast using routing protocol.    History: right 4th digit flexor tendon mass at the volar PIP joint;  Nodule of finger of right hand    Comparison: Right hand x-ray 1/22/2024.    Findings:    External marker is placed over the radial volar aspect of the fourth  PIP joint. Immediately deep to the external marker there is a T2  hyperintense and T1 mildly hypointense well-circumscribed nodule which  demonstrates homogeneous enhancement on postcontrast sequences  measuring approximately 8 x 4 x 4 mm. Lesion appears to be contiguous  with the radial palmar digital artery of the fourth digit (series 13  image 9 and series 6 image 5).     Osseous " structures  Osseous structures: No fracture, stress reaction, or focal osseous  lesion is seen.    Joint and periarticular soft tissue  No significant joint space loss in the visualized interphalangeal  joints.    Muscles and tendons  Muscles: Visualized muscles are unremarkable without evidence of  muscle strain, atrophy or mass.     Tendons: The visualized tendons are intact.    Other Findings:  None.      Impression    Impression:  8 mm T2 hyperintense circumscribed mildly enhancing nodule along the  volar radial aspect of the fourth PIP joint, possibly continuous with  the radial palmar digital artery. Differential diagnosis includes a  vascular malformation, tenosynovial giant cell tumor, and less likely  glomus tumor given the atypical location. Consider dedicated  ultrasound for further evaluation.     I have personally reviewed the examination and initial interpretation  and I agree with the findings.    SHIRA GIFFORD MD         SYSTEM ID:  [id number]          Artem Garcia MD, [facility]    Disclaimer: This note consists of symbols derived from keyboarding, dictation and/or voice recognition software. As a result, there may be errors in the script that have gone undetected. Please consider this when interpreting information found in this chart.

## 2024-03-26 NOTE — LETTER
3/26/2024         RE: Zeina Elaine  [address]  [place] [address]        Dear Colleague,    Thank you for referring your patient, Zeina Elaine, to the [facility] [place]. Please see a copy of my visit note below.    ASSESSMENT & PLAN    Zeina was seen today for pain.    Diagnoses and all orders for this visit:    Nodule of finger of right hand  -     Orthopedic  Referral; Future        # Right 4th Digit Mass: Zeina Elaine  was seen today for right 4th digit mass. Symptoms had been going on for years, with pain affecting his ability to work. On examination there are positive findings of swelling over radial side of right 4th PIP joint with pain with palpation. Imaging findings showed negative right fourth digit finger x-ray, ultrasound showing hypoechoic mass not associated with blood vessel. Previous aspiration attempt unsuccessful.  Reviewed MRI with contrast concerning for giant cell tumor vs vascular malformation but no blood flow seen on repeat ultrasound. Given symptoms are still affecting patients ability to work, plan as below.     Image Findings: Negative right fourth digit x-ray, ultrasound showing mass with no blood flow, Reviewed MRI showing concern for possible giant cell tumor.  Treatment: Activities as tolerated, referral to hand surgeon placed  Medications/Injections: Limited tylenol/ibuprofen for pain for 1-2 weeks, Topical Voltaren gel, none today  Follow-up: Hand surgeon, order placed today    -----    SUBJECTIVE:  Zeina Elaine is a 33 year old male who is seen in follow-up for right finger mass. They were last seen 3/7/2024 and right right PIP aspiration was attempted 3/7/24.  The patient is seen by themselves.    Since their last visit reports worsening finger pain.  Would like to review MRI They indicate that their current pain level is 5/10. They have tried an attempted aspiration 3/7/24.        Patient's past medical, surgical, social, and family histories  "were reviewed today and no changes are noted.    REVIEW OF SYSTEMS:  Constitutional: NEGATIVE for fever, chills, change in weight  Skin: NEGATIVE for worrisome rashes, moles or lesions  GI/: NEGATIVE for bowel or bladder changes  Neuro: NEGATIVE for weakness, dizziness or paresthesias    OBJECTIVE:  Ht 1.816 m (5' 11.5\")   BMI 23.10 kg/m     General: healthy, alert and in no distress  HEENT: no scleral icterus or conjunctival erythema  Skin: no suspicious lesions or rash. No jaundice.  CV: regular rhythm by palpation, no pedal edema  Resp: normal respiratory effort without conversational dyspnea   Psych: normal mood and affect  Gait: normal steady gait with appropriate coordination and balance  Neuro: normal light touch sensory exam of the extremities.    MSK:    Ortho Exam   RIGHT HAND  Inspection:  Mild swelling over 4th digit volar radial PIP joint  Palpation:   Carpals: normal   Metacarpals: normal   Thumb: normal   Fingers: normal  Range of Motion:    Full active flexion and extension at MCP, PIP, and DIP joints; normal finger cascade without malrotation.  Wrist pronation, supination, and ulnar/radial deviation normal.  Strength:     full     Independent visualization of the below image:  Results for orders placed or performed in visit on 03/21/24   MR Finger Right w/o & w Contrast    Narrative    MR right fourth finger without and with contrast 3/21/2024 10:34 AM    Techniques: Multiplanar multisequence imaging of the right fourth  finger was obtained before and after administration of intravenous  contrast using routing protocol.    History: right 4th digit flexor tendon mass at the volar PIP joint;  Nodule of finger of right hand    Comparison: Right hand x-ray 1/22/2024.    Findings:    External marker is placed over the radial volar aspect of the fourth  PIP joint. Immediately deep to the external marker there is a T2  hyperintense and T1 mildly hypointense well-circumscribed nodule " which  demonstrates homogeneous enhancement on postcontrast sequences  measuring approximately 8 x 4 x 4 mm. Lesion appears to be contiguous  with the radial palmar digital artery of the fourth digit (series 13  image 9 and series 6 image 5).     Osseous structures  Osseous structures: No fracture, stress reaction, or focal osseous  lesion is seen.    Joint and periarticular soft tissue  No significant joint space loss in the visualized interphalangeal  joints.    Muscles and tendons  Muscles: Visualized muscles are unremarkable without evidence of  muscle strain, atrophy or mass.     Tendons: The visualized tendons are intact.    Other Findings:  None.      Impression    Impression:  8 mm T2 hyperintense circumscribed mildly enhancing nodule along the  volar radial aspect of the fourth PIP joint, possibly continuous with  the radial palmar digital artery. Differential diagnosis includes a  vascular malformation, tenosynovial giant cell tumor, and less likely  glomus tumor given the atypical location. Consider dedicated  ultrasound for further evaluation.     I have personally reviewed the examination and initial interpretation  and I agree with the findings.    SHIRA GIFFORD MD         SYSTEM ID:  [id number]          Artem Garcia MD, [facility]    Disclaimer: This note consists of symbols derived from keyboarding, dictation and/or voice recognition software. As a result, there may be errors in the script that have gone undetected. Please consider this when interpreting information found in this chart.      Again, thank you for allowing me to participate in the care of your patient.        Sincerely,        Artem Garcia MD

## 2024-03-28 ENCOUNTER — TELEPHONE (OUTPATIENT)
Dept: ORTHOPEDICS | Facility: CLINIC | Age: 34
End: 2024-03-28
Payer: COMMERCIAL

## 2024-03-28 NOTE — TELEPHONE ENCOUNTER
Patient confirmed scheduled appointment:  Date: 4/1/2024  Time: 11:30 am  Visit type: New Hand/Wrist  Provider: Ros BARRAGAN  Location[place] [facility]  Testing/imaging:

## 2024-03-29 NOTE — TELEPHONE ENCOUNTER
DIAGNOSIS: Nodule of finger of right hand [R22.31]  - Primary   APPOINTMENT DATE: 04/01/2024   NOTES STATUS DETAILS   OFFICE NOTE from referring provider Internal 03/26/2024 - Artem Garcia MD - [facility]   (IMAGES & REPORTS) Internal

## 2024-03-31 SDOH — HEALTH STABILITY: PHYSICAL HEALTH: ON AVERAGE, HOW MANY MINUTES DO YOU ENGAGE IN EXERCISE AT THIS LEVEL?: 10 MIN

## 2024-03-31 SDOH — HEALTH STABILITY: PHYSICAL HEALTH: ON AVERAGE, HOW MANY DAYS PER WEEK DO YOU ENGAGE IN MODERATE TO STRENUOUS EXERCISE (LIKE A BRISK WALK)?: 1 DAY

## 2024-03-31 ASSESSMENT — SOCIAL DETERMINANTS OF HEALTH (SDOH): HOW OFTEN DO YOU GET TOGETHER WITH FRIENDS OR RELATIVES?: ONCE A WEEK

## 2024-04-01 ENCOUNTER — OFFICE VISIT (OUTPATIENT)
Dept: ORTHOPEDICS | Facility: CLINIC | Age: 34
End: 2024-04-01
Payer: COMMERCIAL

## 2024-04-01 ENCOUNTER — PRE VISIT (OUTPATIENT)
Dept: ORTHOPEDICS | Facility: CLINIC | Age: 34
End: 2024-04-01

## 2024-04-01 DIAGNOSIS — R22.31 NODULE OF FINGER OF RIGHT HAND: ICD-10-CM

## 2024-04-01 PROCEDURE — 99243 OFF/OP CNSLTJ NEW/EST LOW 30: CPT | Performed by: PHYSICIAN ASSISTANT

## 2024-04-01 NOTE — NURSING NOTE
Teaching Flowsheet   Relevant Diagnosis: mass on right ring finger  Teaching Topic: Mass excision of right ring finger    CSC under local anesthetic , surgeon to be determined    Patient is going to contact Ros CHARLES to let her know if he would like to proceed with surgery.     Person(s) involved in teaching:   Patient     Motivation Level:  Asks Questions: Yes  Eager to Learn: Yes  Cooperative: Yes  Receptive (willing/able to accept information): Yes  Any cultural factors/Confucianism beliefs that may influence understanding or compliance? No    Patient demonstrates understanding of the following:  Reason for the appointment, diagnosis and treatment plan: Yes  Knowledge of proper use of medications and conditions for which they are ordered (with special attention to potential side effects or drug interactions): Yes  Which situations necessitate calling provider and whom to contact: Yes    Teaching Concerns Addressed:   Proper use and care of  (medical equip, care aids, etc.): Yes  Nutritional needs and diet plan: Yes  Pain management techniques: Yes  Wound Care: Yes  How and/when to access community resources: Yes     Instructional Materials Used/Given: Preoperative surgery packet, antibacterial Chlorhexidine soap. Stop Light Tool reviewed, after-hours number provided, patient verbalized understanding, had no immediate questions. Luisa Xiong, RN

## 2024-04-01 NOTE — PROGRESS NOTES
Hand Surgery History & Physical    REFERRING PHYSICIAN: Shilo Kapadia   PRIMARY CARE PHYSICIAN: Shilo Kapadia     Chief Complaint:   Consult (Mass on Right Ring finger )      History of Present Illness:     Zeina Elaine is a 33 year old right-hand dominant male who presents for evaluation of right ring finger mass.  Patient reports that this has been present for several years.  Has not changed in size.  He has minimal pain at rest.  He has some discomfort when gripping or squeezing items.  He works as a  and it does not bother him much at work.    Patient was previously seen by Dr. Garcia primary care sports medicine where an ultrasound, MRI were obtained.  Patient also underwent attempted ultrasound aspiration without success.  Patient notes that he had some increased soreness after the aspiration attempt, but this is since resolved.  Patient was referred here for further evaluation and discussion of treatment options.  Per documentation by Dr. Garcia, bedside ultrasound demonstrated a hypoechoic mass without evidence of blood flow.    Occupation: .     Past Medical History:   No past medical history on file.    Past Surgical History:   No past surgical history on file.    Social History:     Social History     Tobacco Use    Smoking status: Some Days     Types: Cigarettes    Smokeless tobacco: Never   Substance Use Topics    Alcohol use: Not on file       Family History:   No family history on file.    Allergies:   No Known Allergies    Medications:     Current Outpatient Medications   Medication    omeprazole (PRILOSEC) 40 MG DR capsule     Current Facility-Administered Medications   Medication    0.5 mL ropivacaine (NAROPIN) injection 5 mg/mL        Review of Systems:     A 10 point ROS was performed and reviewed. Specific responses to these questions are noted at the end of the document.    Physical Exam:   Physical Exam Adult:    Musculoskeletal: A focused physical examination of the  right ring finger reveals:   Inspection  -skin is clean dry and intact.  No deformity.  Palpation -0.5 mm mobile nontender mass over the volar radial aspect of the PIP of the ring finger.  Negative Tinel's over the mass.  Neurovascular- intact light touch sensation and motor to distribution of the median, ulnar and radial nerves. Brisk capillary refill to the distal fingers.   Range of Motion: Full range of motion of the digit.  Able to make a full composite fist.  Muscle strength and tone: Fires FDP FDS and extensor to the ring finger without difficulty.    Imaging:   3 view X-ray and MRI of the right ring finger was independently interpreted by me. The results were discussed with the patient.  Findings include: 2.8 mm enhancing fluid-filled nodule over the radial aspect of the PIP of the fourth finger.  Per formal MRI read, differential diagnosis includes a vascular malformation, tenosynovial giant cell tumor, and less likely glomus tumor given the atypical location.     Assessment and Plan:   Assessment:  33 year old male with right ring finger PIP mass, suspect ganglion cyst versus vascular malformation versus tennis tenosynovial giant cell tumor.     Plan: Discussed treatment options with the patient including continued observation versus surgical excision.  Would not recommend repeat attempt at aspiration.  Discussed surgical intervention in the form of a mass excision.  I discussed the procedure, postoperative plan, as well as risk and benefits.  After consideration patient elected to continue with conservative management as it is not currently bothersome.  He will contact us should he like to proceed with any surgical intervention.  Preoperative teaching completed in clinic today.  Patient states that if he did to surgery he would do local only.  Patient will send us a MyChart if he changes his mind and like to proceed with surgical intervention.  No return clinic visit needed, case orders will be placed at  that time. No surgeon preference.     Ros Mendoza PA-C   Orthopedic Surgery  4/1/2024 11:17 AM

## 2024-04-01 NOTE — LETTER
4/1/2024         RE: Zeina Elaine  [address] [place] [address]        Dear Colleague,    Thank you for referring your patient, Zeina Elaine, to the [facility] [place]. Please see a copy of my visit note below.    Hand Surgery History & Physical    REFERRING PHYSICIAN: Shilo Kapadia   PRIMARY CARE PHYSICIAN: Shilo Kapadia     Chief Complaint:   Consult (Mass on Right Ring finger )      History of Present Illness:     Zeina Elaine is a 33 year old right-hand dominant male who presents for evaluation of right ring finger mass.  Patient reports that this has been present for several years.  Has not changed in size.  He has minimal pain at rest.  He has some discomfort when gripping or squeezing items.  He works as a  and it does not bother him much at work.    Patient was previously seen by Dr. Garcia primary care sports medicine where an ultrasound, MRI were obtained.  Patient also underwent attempted ultrasound aspiration without success.  Patient notes that he had some increased soreness after the aspiration attempt, but this is since resolved.  Patient was referred here for further evaluation and discussion of treatment options.  Per documentation by Dr. Garcia, bedside ultrasound demonstrated a hypoechoic mass without evidence of blood flow.    Occupation: .     Past Medical History:   No past medical history on file.    Past Surgical History:   No past surgical history on file.    Social History:     Social History     Tobacco Use    Smoking status: Some Days     Types: Cigarettes    Smokeless tobacco: Never   Substance Use Topics    Alcohol use: Not on file       Family History:   No family history on file.    Allergies:   No Known Allergies    Medications:     Current Outpatient Medications   Medication    omeprazole (PRILOSEC) 40 MG DR capsule     Current Facility-Administered Medications   Medication    0.5 mL ropivacaine (NAROPIN) injection 5 mg/mL         Review of Systems:     A 10 point ROS was performed and reviewed. Specific responses to these questions are noted at the end of the document.    Physical Exam:   Physical Exam Adult:    Musculoskeletal: A focused physical examination of the right ring finger reveals:   Inspection  -skin is clean dry and intact.  No deformity.  Palpation -0.5 mm mobile nontender mass over the volar radial aspect of the PIP of the ring finger.  Negative Tinel's over the mass.  Neurovascular- intact light touch sensation and motor to distribution of the median, ulnar and radial nerves. Brisk capillary refill to the distal fingers.   Range of Motion: Full range of motion of the digit.  Able to make a full composite fist.  Muscle strength and tone: Fires FDP FDS and extensor to the ring finger without difficulty.    Imaging:   3 view X-ray and MRI of the right ring finger was independently interpreted by me. The results were discussed with the patient.  Findings include: 2.8 mm enhancing fluid-filled nodule over the radial aspect of the PIP of the fourth finger.  Per formal MRI read, differential diagnosis includes a vascular malformation, tenosynovial giant cell tumor, and less likely glomus tumor given the atypical location.     Assessment and Plan:   Assessment:  33 year old male with right ring finger PIP mass, suspect ganglion cyst versus vascular malformation versus tennis tenosynovial giant cell tumor.     Plan: Discussed treatment options with the patient including continued observation versus surgical excision.  Would not recommend repeat attempt at aspiration.  Discussed surgical intervention in the form of a mass excision.  I discussed the procedure, postoperative plan, as well as risk and benefits.  After consideration patient elected to continue with conservative management as it is not currently bothersome.  He will contact us should he like to proceed with any surgical intervention.  Preoperative teaching completed  in clinic today.  Patient states that if he did to surgery he would do local only.  Patient will send us a MyChart if he changes his mind and like to proceed with surgical intervention.  No return clinic visit needed, case orders will be placed at that time. No surgeon preference.     Ros Mendoza PA-C   Orthopedic Surgery  4/1/2024 11:17 AM

## 2024-04-01 NOTE — NURSING NOTE
Reason For Visit:   Chief Complaint   Patient presents with    Consult     Mass on Right Ring finger        Primary MD: Shilo Kapadia  Ref. MD: Jose    Age: 33 year old    ?  No      There were no vitals taken for this visit.      Pain Assessment  Patient Currently in Pain: Yes  0-10 Pain Scale: 4 (with pressure)  Primary Pain Location: Finger (Comment which one) (Right ring finger)  Pain Descriptors: Intermittent, Sharp    Hand Dominance Evaluation  Hand Dominance: Right          QuickDASH Assessment      3/31/2024    12:07 PM   QuickDASH Main   1. Open a tight or new jar No difficulty   2. Do heavy household chores (e.g., wash walls, floors) No difficulty   3. Carry a shopping bag or briefcase No difficulty   4. Wash your back No difficulty   5. Use a knife to cut food No difficulty   6. Recreational activities in which you take some force or impact through your arm, shoulder or hand (e.g., golf, hammering, tennis, etc.) No difficulty   7. During the past week, to what extent has your arm, shoulder or hand problem interfered with your normal social activities with family, friends, neighbours or groups Not at all   8. During the past week, were you limited in your work or other regular daily activities as a result of your arm, shoulder or hand problem Not limited at all   9. Arm, shoulder or hand pain None   10.Tingling (pins and needles) in your arm,shoulder or hand None   11. During the past week, how much difficulty have you had sleeping because of the pain in your arm, shoulder or hand No difficulty   Quickdash Ability Score 0          Current Outpatient Medications   Medication Sig Dispense Refill    omeprazole (PRILOSEC) 40 MG DR capsule Take 1 capsule (40 mg) by mouth daily (Patient not taking: Reported on 3/1/2024) 30 capsule 1       No Known Allergies    DANYA GUTIERREZ, ATC

## 2024-04-03 ENCOUNTER — OFFICE VISIT (OUTPATIENT)
Dept: FAMILY MEDICINE | Facility: CLINIC | Age: 34
End: 2024-04-03
Payer: COMMERCIAL

## 2024-04-03 VITALS
HEIGHT: 72 IN | DIASTOLIC BLOOD PRESSURE: 85 MMHG | HEART RATE: 80 BPM | WEIGHT: 166.6 LBS | BODY MASS INDEX: 22.57 KG/M2 | SYSTOLIC BLOOD PRESSURE: 131 MMHG | TEMPERATURE: 96.9 F | OXYGEN SATURATION: 100 %

## 2024-04-03 DIAGNOSIS — Z13.220 LIPID SCREENING: ICD-10-CM

## 2024-04-03 DIAGNOSIS — Z13.1 SCREENING FOR DIABETES MELLITUS: ICD-10-CM

## 2024-04-03 DIAGNOSIS — Z00.00 ROUTINE GENERAL MEDICAL EXAMINATION AT A HEALTH CARE FACILITY: Primary | ICD-10-CM

## 2024-04-03 DIAGNOSIS — F17.200 TOBACCO USE DISORDER: ICD-10-CM

## 2024-04-03 LAB
ANION GAP SERPL CALCULATED.3IONS-SCNC: 8 MMOL/L (ref 7–15)
BUN SERPL-MCNC: 12 MG/DL (ref 6–20)
CALCIUM SERPL-MCNC: 9.3 MG/DL (ref 8.6–10)
CHLORIDE SERPL-SCNC: 104 MMOL/L (ref 98–107)
CHOLEST SERPL-MCNC: 180 MG/DL
CREAT SERPL-MCNC: 0.76 MG/DL (ref 0.67–1.17)
DEPRECATED HCO3 PLAS-SCNC: 27 MMOL/L (ref 22–29)
EGFRCR SERPLBLD CKD-EPI 2021: >90 ML/MIN/1.73M2
FASTING STATUS PATIENT QL REPORTED: YES
GLUCOSE SERPL-MCNC: 97 MG/DL (ref 70–99)
HBA1C MFR BLD: 5.3 % (ref 0–5.6)
HDLC SERPL-MCNC: 63 MG/DL
LDLC SERPL CALC-MCNC: 107 MG/DL
NONHDLC SERPL-MCNC: 117 MG/DL
POTASSIUM SERPL-SCNC: 4.1 MMOL/L (ref 3.4–5.3)
SODIUM SERPL-SCNC: 139 MMOL/L (ref 135–145)
TRIGL SERPL-MCNC: 52 MG/DL

## 2024-04-03 PROCEDURE — 36415 COLL VENOUS BLD VENIPUNCTURE: CPT | Performed by: NURSE PRACTITIONER

## 2024-04-03 PROCEDURE — 99395 PREV VISIT EST AGE 18-39: CPT | Performed by: NURSE PRACTITIONER

## 2024-04-03 PROCEDURE — 80061 LIPID PANEL: CPT | Performed by: NURSE PRACTITIONER

## 2024-04-03 PROCEDURE — 83036 HEMOGLOBIN GLYCOSYLATED A1C: CPT | Performed by: NURSE PRACTITIONER

## 2024-04-03 PROCEDURE — 80048 BASIC METABOLIC PNL TOTAL CA: CPT | Performed by: NURSE PRACTITIONER

## 2024-04-03 ASSESSMENT — PAIN SCALES - GENERAL: PAINLEVEL: NO PAIN (0)

## 2024-04-03 NOTE — PATIENT INSTRUCTIONS
Preventive Care Advice   This is general advice given by our system to help you stay healthy. However, your care team may have specific advice just for you. Please talk to your care team about your preventive care needs.  Nutrition  Eat 5 or more servings of fruits and vegetables each day.  Try wheat bread, brown rice and whole grain pasta (instead of white bread, rice, and pasta).  Get enough calcium and vitamin D. Check the label on foods and aim for 100% of the RDA (recommended daily allowance).  Lifestyle  Exercise at least 150 minutes each week   (30 minutes a day, 5 days a week).  Do muscle strengthening activities 2 days a week. These help control your weight and prevent disease.  No smoking.  Wear sunscreen to prevent skin cancer.  Have a dental exam and cleaning every 6 months.  Yearly exams  See your health care team every year to talk about:  Any changes in your health.  Any medicines your care team has prescribed.  Preventive care, family planning, and ways to prevent chronic diseases.  Shots (vaccines)   HPV shots (up to age 26), if you've never had them before.  Hepatitis B shots (up to age 59), if you've never had them before.  COVID-19 shot: Get this shot when it's due.  Flu shot: Get a flu shot every year.  Tetanus shot: Get a tetanus shot every 10 years.  Pneumococcal, hepatitis A, and RSV shots: Ask your care team if you need these based on your risk.  Shingles shot (for age 50 and up).  General health tests  Diabetes screening:  Starting at age 35, Get screened for diabetes at least every 3 years.  If you are younger than age 35, ask your care team if you should be screened for diabetes.  Cholesterol test: At age 39, start having a cholesterol test every 5 years, or more often if advised.  Bone density scan (DEXA): At age 50, ask your care team if you should have this scan for osteoporosis (brittle bones).  Hepatitis C: Get tested at least once in your life.  STIs (sexually transmitted  infections)  Before age 24: Ask your care team if you should be screened for STIs.  After age 24: Get screened for STIs if you're at risk. You are at risk for STIs (including HIV) if:  You are sexually active with more than one person.  You don't use condoms every time.  You or a partner was diagnosed with a sexually transmitted infection.  If you are at risk for HIV, ask about PrEP medicine to prevent HIV.  Get tested for HIV at least once in your life, whether you are at risk for HIV or not.  Cancer screening tests  Cervical cancer screening: If you have a cervix, begin getting regular cervical cancer screening tests at age 21. Most people who have regular screenings with normal results can stop after age 65. Talk about this with your provider.  Breast cancer scan (mammogram): If you've ever had breasts, begin having regular mammograms starting at age 40. This is a scan to check for breast cancer.  Colon cancer screening: It is important to start screening for colon cancer at age 45.  Have a colonoscopy test every 10 years (or more often if you're at risk) Or, ask your provider about stool tests like a FIT test every year or Cologuard test every 3 years.  To learn more about your testing options, visit: https://www.[employer]/427956.pdf.  For help making a decision, visit: https://bit.ly/lx27239.  Prostate cancer screening test: If you have a prostate and are age 55 to 69, ask your provider if you would benefit from a yearly prostate cancer screening test.  Lung cancer screening: If you are a current or former smoker age 50 to 80, ask your care team if ongoing lung cancer screenings are right for you.  For informational purposes only. Not to replace the advice of your health care provider. Copyright   2023 [employer]. All rights reserved. Clinically reviewed by the [facility] Transitions Program. [employer] 147227 - REV 01/24.    Learning About Stress  What is stress?     Stress is your  body's response to a hard situation. Your body can have a physical, emotional, or mental response. Stress is a fact of life for most people, and it affects everyone differently. What causes stress for you may not be stressful for someone else.  A lot of things can cause stress. You may feel stress when you go on a job interview, take a test, or run a race. This kind of short-term stress is normal and even useful. It can help you if you need to work hard or react quickly. For example, stress can help you finish an important job on time.  Long-term stress is caused by ongoing stressful situations or events. Examples of long-term stress include long-term health problems, ongoing problems at work, or conflicts in your family. Long-term stress can harm your health.  How does stress affect your health?  When you are stressed, your body responds as though you are in danger. It makes hormones that speed up your heart, make you breathe faster, and give you a burst of energy. This is called the fight-or-flight stress response. If the stress is over quickly, your body goes back to normal and no harm is done.  But if stress happens too often or lasts too long, it can have bad effects. Long-term stress can make you more likely to get sick, and it can make symptoms of some diseases worse. If you tense up when you are stressed, you may develop neck, shoulder, or low back pain. Stress is linked to high blood pressure and heart disease.  Stress also harms your emotional health. It can make you rodrigues, tense, or depressed. Your relationships may suffer, and you may not do well at work or school.  What can you do to manage stress?  You can try these things to help manage stress:   Do something active. Exercise or activity can help reduce stress. Walking is a great way to get started. Even everyday activities such as housecleaning or yard work can help.  Try yoga or jocelyn chi. These techniques combine exercise and meditation. You may need  some training at first to learn them.  Do something you enjoy. For example, listen to music or go to a movie. Practice your hobby or do volunteer work.  Meditate. This can help you relax, because you are not worrying about what happened before or what may happen in the future.  Do guided imagery. Imagine yourself in any setting that helps you feel calm. You can use online videos, books, or a teacher to guide you.  Do breathing exercises. For example:  From a standing position, bend forward from the waist with your knees slightly bent. Let your arms dangle close to the floor.  Breathe in slowly and deeply as you return to a standing position. Roll up slowly and lift your head last.  Hold your breath for just a few seconds in the standing position.  Breathe out slowly and bend forward from the waist.  Let your feelings out. Talk, laugh, cry, and express anger when you need to. Talking with supportive friends or family, a counselor, or a terry leader about your feelings is a healthy way to relieve stress. Avoid discussing your feelings with people who make you feel worse.  Write. It may help to write about things that are bothering you. This helps you find out how much stress you feel and what is causing it. When you know this, you can find better ways to cope.  What can you do to prevent stress?  You might try some of these things to help prevent stress:  Manage your time. This helps you find time to do the things you want and need to do.  Get enough sleep. Your body recovers from the stresses of the day while you are sleeping.  Get support. Your family, friends, and community can make a difference in how you experience stress.  Limit your news feed. Avoid or limit time on social media or news that may make you feel stressed.  Do something active. Exercise or activity can help reduce stress. Walking is a great way to get started.  Where can you learn more?  Go to https://www.healthwise.net/patiented  Enter N032 in the  "search box to learn more about \"Learning About Stress.\"  Current as of: October 24, 2023               Content Version: 14.0    7538-3576 [employer].   Care instructions adapted under license by your healthcare professional. If you have questions about a medical condition or this instruction, always ask your healthcare professional. [employer] disclaims any warranty or liability for your use of this information.      "

## 2024-04-03 NOTE — PROGRESS NOTES
Preventive Care Visit  [facility]  Paige AMINSammy Angelo CNP,    Apr 3, 2024      Assessment & Plan     Routine general medical examination at a health care facility  Continue annual exams  Recommended adding exercise routine, cutting back on alcohol and smoking cessation.      Screening for diabetes mellitus  Labs in process.   - Hemoglobin A1c  - Basic metabolic panel  (Ca, Cl, CO2, Creat, Gluc, K, Na, BUN)    Lipid screening  Labs in process.   - Lipid panel reflex to direct LDL Fasting    Tobacco use disorder  Not interested in help with quitting.  He plans to slowly discontinue.      Patient has been advised of split billing requirements and indicates understanding: Yes      Counseling  Appropriate preventive services were discussed with this patient, including applicable screening as appropriate for fall prevention, nutrition, physical activity, Tobacco-use cessation, weight loss and cognition.  Checklist reviewing preventive services available has been given to the patient.  Reviewed patient's diet, addressing concerns and/or questions.   He is at risk for lack of exercise and has been provided with information to increase physical activity for the benefit of his well-being.   The patient was instructed to see the dentist every 6 months.   He is at risk for psychosocial distress and has been provided with information to reduce risk.     Win Pastrana is a 33 year old, presenting for the following:  Physical        4/3/2024     9:10 AM   Additional Questions   Roomed by earl        Health Care Directive  Patient does not have a Health Care Directive or Living Will: No    HPI      Having hair loss.  Saw another provider here and had normal labs.  Seeing dermatology tomorrow.     Cigarettes- 6 per day.  Cutting back.  Declines help with quitting.   Cutting back on alcohol.  Drinks 2-3 shots of burboun every night, trying to cut back.      Mom had breast cancer.  No other family cancers he  is aware of.   No family hx of heart disease or DM.              3/31/2024   General Health   How would you rate your overall physical health? Good   Feel stress (tense, anxious, or unable to sleep) Only a little   (!) STRESS CONCERN      3/31/2024   Nutrition   Three or more servings of calcium each day? Yes   Diet: Regular (no restrictions)   How many servings of fruit and vegetables per day? (!) 2-3   How many sweetened beverages each day? 0-1         3/31/2024   Exercise   Days per week of moderate/strenous exercise 1 day   Average minutes spent exercising at this level 10 min   (!) EXERCISE CONCERN      3/31/2024   Social Factors   Frequency of gathering with friends or relatives Once a week   Worry food won't last until get money to buy more No   Food not last or not have enough money for food? No   Do you have housing?  Yes   Are you worried about losing your housing? No   Lack of transportation? No   Unable to get utilities (heat,electricity)? No         3/31/2024   Dental   Dentist two times every year? (!) NO         3/31/2024   TB Screening   Were you born outside of the [place]? Yes         Today's PHQ-2 Score:       1/22/2024     1:46 PM   PHQ-2 ( 1999 [employer])   Q1: Little interest or pleasure in doing things 1   Q2: Feeling down, depressed or hopeless 0   PHQ-2 Score 1   Q1: Little interest or pleasure in doing things Several days   Q2: Feeling down, depressed or hopeless Not at all   PHQ-2 Score 1         3/31/2024   Substance Use   Alcohol more than 3/day or more than 7/wk No   Do you use any other substances recreationally? No     Social History     Tobacco Use    Smoking status: Some Days     Types: Cigarettes    Smokeless tobacco: Never   Vaping Use    Vaping Use: Never used         3/31/2024   STI Screening   New sexual partner(s) since last STI/HIV test? No         3/31/2024   Contraception/Family Planning   Questions about contraception or family planning No     Reviewed and updated as needed this  "visit by Provider   Tobacco  Allergies  Meds  Problems  Med Hx  Surg Hx  Fam Hx                Review of Systems  Constitutional, neuro, ENT, endocrine, pulmonary, cardiac, gastrointestinal, genitourinary, musculoskeletal, integument and psychiatric systems are negative, except as otherwise noted.     Objective    Exam  /85   Pulse 80   Temp 96.9  F (36.1  C)   Ht 1.816 m (5' 11.5\")   Wt 75.6 kg (166 lb 9.6 oz)   SpO2 100%   BMI 22.91 kg/m     Estimated body mass index is 22.91 kg/m  as calculated from the following:    Height as of this encounter: 1.816 m (5' 11.5\").    Weight as of this encounter: 75.6 kg (166 lb 9.6 oz).    Physical Exam  GENERAL: alert and no distress  EYES: Eyes grossly normal to inspection, PERRL and conjunctivae and sclerae normal  HENT: ear canals and TM's normal, nose and mouth without ulcers or lesions  NECK: no adenopathy, no asymmetry, masses, or scars  RESP: lungs clear to auscultation - no rales, rhonchi or wheezes  CV: regular rate and rhythm, normal S1 S2, no S3 or S4, no murmur, click or rub, no peripheral edema  ABDOMEN: soft, nontender, no hepatosplenomegaly, no masses and bowel sounds normal  MS: no gross musculoskeletal defects noted, no edema  SKIN: no suspicious lesions or rashes  NEURO: Normal strength and tone, mentation intact and speech normal  PSYCH: mentation appears normal, affect normal/bright        Signed Electronically by: Paige Angelo CNP    "

## 2024-11-13 ENCOUNTER — MYC MEDICAL ADVICE (OUTPATIENT)
Dept: ORTHOPEDICS | Facility: CLINIC | Age: 34
End: 2024-11-13
Payer: COMMERCIAL

## 2024-11-15 DIAGNOSIS — R22.31 NODULE OF FINGER OF RIGHT HAND: Primary | ICD-10-CM

## 2024-11-18 NOTE — TELEPHONE ENCOUNTER
Phoned patient to get him scheduled for surgery   with Dr. Power.     Call went to voicemail.  Provided call back number in voicemail:   [phone] & [phone] for care team.   Will try again.

## 2024-11-26 ENCOUNTER — TELEPHONE (OUTPATIENT)
Dept: ORTHOPEDICS | Facility: CLINIC | Age: 34
End: 2024-11-26
Payer: COMMERCIAL

## 2024-11-26 PROBLEM — R22.31 NODULE OF FINGER OF RIGHT HAND: Status: ACTIVE | Noted: 2024-11-15

## 2024-11-26 NOTE — TELEPHONE ENCOUNTER
Second attempt to reach patient to get him schedule for surgery with Dr. Power.     Call went to voicemail. Provided reason of call and call back number of .    Will try again later.

## 2024-11-26 NOTE — TELEPHONE ENCOUNTER
Patient is scheduled for surgery with Dr. Power     Spoke with: Zeina     Date of Surgery: 12/10/24    Location: [facility]    Informed patient they will need an adult  Yes    Pre op with Provider N/A-Local     Additional imaging/appointments: PO Made    Surgery packet: Received     Additional comments: N/A        Alethea Limon on 11/26/2024 at 3:31 PM

## 2024-12-10 ENCOUNTER — HOSPITAL ENCOUNTER (OUTPATIENT)
Facility: AMBULATORY SURGERY CENTER | Age: 34
Discharge: HOME | End: 2024-12-10
Attending: STUDENT IN AN ORGANIZED HEALTH CARE EDUCATION/TRAINING PROGRAM
Payer: COMMERCIAL

## 2024-12-10 VITALS
WEIGHT: 172 LBS | DIASTOLIC BLOOD PRESSURE: 96 MMHG | TEMPERATURE: 98.2 F | RESPIRATION RATE: 16 BRPM | HEART RATE: 69 BPM | SYSTOLIC BLOOD PRESSURE: 135 MMHG | HEIGHT: 72 IN | BODY MASS INDEX: 23.3 KG/M2 | OXYGEN SATURATION: 98 %

## 2024-12-10 DIAGNOSIS — R22.31 NODULE OF FINGER OF RIGHT HAND: Primary | ICD-10-CM

## 2024-12-10 DIAGNOSIS — G89.18 ACUTE POST-OPERATIVE PAIN: ICD-10-CM

## 2024-12-10 PROCEDURE — 88307 TISSUE EXAM BY PATHOLOGIST: CPT | Mod: 26 | Performed by: PATHOLOGY

## 2024-12-10 PROCEDURE — 88307 TISSUE EXAM BY PATHOLOGIST: CPT | Mod: TC | Performed by: STUDENT IN AN ORGANIZED HEALTH CARE EDUCATION/TRAINING PROGRAM

## 2024-12-10 RX ORDER — LIDOCAINE HYDROCHLORIDE 10 MG/ML
5 INJECTION, SOLUTION EPIDURAL; INFILTRATION; INTRACAUDAL; PERINEURAL ONCE
Status: DISCONTINUED | OUTPATIENT
Start: 2024-12-10 | End: 2024-12-11 | Stop reason: HOSPADM

## 2024-12-10 RX ORDER — BUPIVACAINE HYDROCHLORIDE 5 MG/ML
5 INJECTION, SOLUTION EPIDURAL; INTRACAUDAL ONCE
Status: DISCONTINUED | OUTPATIENT
Start: 2024-12-10 | End: 2024-12-11 | Stop reason: HOSPADM

## 2024-12-10 RX ORDER — OXYCODONE HYDROCHLORIDE 5 MG/1
5 TABLET ORAL EVERY 6 HOURS PRN
Qty: 5 TABLET | Refills: 0 | Status: SHIPPED | OUTPATIENT
Start: 2024-12-10 | End: 2024-12-13

## 2024-12-10 NOTE — DISCHARGE INSTRUCTIONS
Date: 12/10/2024    DIAGNOSIS  1. Nodule of finger of right hand        MEDICATIONS   Resume all home medications as directed unless otherwise instructed during this hospitalization. If there is any question, double check with your primary care provider.  Start new discharge medications as directed.    Take 1 tablet of 650 mg Tylenol (acetaminophen) Arthritis Strength (extended release) and 1 tablet of Aleve (naproxen) 220 mg in the morning with breakfast and in the evening with dinner.     For breakthrough pain use narcotic pain medication as prescribed.    Do not drive or operate machinery while taking narcotic pain medications.   If you are taking other Tylenol containing medicines at home, be sure NOT to exceed 4 gram's (4000 milligrams) of Tylenol per day.   If you are taking pain medications, be sure to take Colace (docusate sodium) as well to prevent constipation. If constipated, try adding another cathartic or enema.  If nausea and vomiting, call the hospital or seek medical attention.    ACTIVITY   Weight bearin lb coffee cup weight bearing to operative extremity    DIET  Resume same diet prior to your hospital admission.    WOUND   Leave dressing on until you are seen in clinic for your follow up visit.   Watch for signs and symptoms of infection of your wounds including; pain, redness, swelling, drainage or fever.  If you notice any of these symptoms please call or seek medical attention.    Keep wound clean, dry, and intact.  Do not submerge wounds in water until they are healed. No baths, soaking, swimming, or prolonged water exposure for 4 weeks after surgery.    RETURN   Follow-up with Orthopedic Clinic as directed.     Future Appointments   Date Time Provider Department Center   2024  4:00 PM Kim Power MD [facility]       Call the [facility] at [phone] during business hours for any symptoms such as:    * Fevers with Temperature greater than 101.5  "degrees.   * Pus drainage from wound site.   * Severe pain, not controlled by medication.   * Persistent nausea, vomiting and inablility to tolerate fluids.    If you are receiving care in [place], you may call the Orthopedic clinic at [phone].    FOR URGENT PROBLEMS ONLY, after hours or on weekends call the hospital  at [phone] and ask to speak with the orthopedic resident on call.    [facility]  Home Care Following Your Procedure  Call a doctor if you have signs of infection (fever, growing tenderness at the surgery site, a large amount of drainage or bleeding, severe pain, foul-smelling drainage, redness, swelling).  Today you received a Marcaine or bupivacaine block to numb the nerves near your surgery site.  This is a block using local anesthetic or \"numbing\" medication injected around the nerves to anesthetize or \"numb\" the area supplied by those nerves.  This block is injected into the muscle layer near your surgical site.  The medication may numb the location where you had surgery for 6-18 hours, but may last up to 24 hours.  If your surgical site is an arm or leg you should be careful with your affected limb, since it is possible to injure your limb without being aware of it due to the numbing.  Until full feeling returns, you should guard against bumping or hitting your limb, and avoid extreme hot or cold temperatures on the skin.  As the block wears off, the feeling will return as a tingling or prickly sensation near your surgical site.  You will experience more discomfort from your incision as the feeling returns.  You may want to take a pain pill (a narcotic or Tylenol if this was prescribed by your surgeon) when you start to experience mild pain before the pain becomes more severe.  If your pain medications do not control your pain you should notifiy your surgeon.        Tylenol/Acetaminophen Consumption    If you feel your pain relief is " insufficient, you may take Tylenol/Acetaminophen in addition to your narcotic pain medication.   Be careful not to exceed 4,000 mg of Tylenol/Acetaminophen in a 24 hour period from all sources.  If you are taking extra strength Tylenol/acetaminophen (500 mg), the maximum dose is 8 tablets in 24 hours.  If you are taking regular strength acetaminophen (325 mg), the maximum dose is 12 tablets in 24 hours.      Your doctor is:       Dr. Kim Power, Orthopaedics: [phone]             Or dial [phone] and ask for the resident on call for:  Orthopaedics  For emergency care, call the:  [facility]: [phone] (TTY for hearing impaired: [phone])

## 2024-12-10 NOTE — OP NOTE
OPERATIVE REPORT    PATIENT NAME: Zeina Elaine  MRN: [id number]    DATE: 12/10/2024    PREOPERATIVE DIAGNOSIS:   1. Right ring finger mass    POSTOPERATIVE DIAGNOSIS:   1. Right ring finger mass    OPERATION:   1. Right ring finger subcutaneous mass excision    SURGEON: Kim Power MD    ASSISTANT: Ros Mendoza PA-C    The physician assistant was necessary for the procedure. They placed and held retractors to provide adequate exposure that allowed the case to proceed in a safe, efficient manner. They assisted in identifying and protecting important structures. They ligated blood vessels to maintain hemostasis and minimize bleeding risk. They suctioned fluids when needed. They performed or assisted with wound closure of the operative incisions. An experienced physician assistant was medically necessary to ensure a safe and efficient procedure. The assistance that they provided decreased operative time and thereby, reduced the risk of infection and complications from prolonged time of anesthesia. Their assistance was vital in achieving best practices. No qualified residents or fellows were available to assist with this case.       STAFF: Circulator: Amanda Grant RN  Scrub Person: Nohemi Quezada Monitor: Omari Kimbrough    ANESTHESIA: Local    IMPLANTS: * No implants in log *    ESTIMATED BLOOD LOSS: 1 mL    FLUIDS: See anesthesia records.     URINE OUTPUT: Not applicable.  Qureshi was not placed.     TOURNIQUET TIME: 5 minutes.    COMPLICATION: None.     INDICATIONS: Zeina Elaine is a 34 year old male who developed a right ring finger mass which has been symptomatic.  After a thorough evaluation and discussion of treatment options, the patient was indicated for operative intervention.  The risks, benefits, and alternatives were discussed with the patient.  The patient verbalized understanding of the treatment plan and signed the consent.    DESCRIPTION OF PROCEDURE: The base of the ring finger was  prepped with alcohol and a digital block was performed using 1% lidocaine and 0.5% bupivacaine. The patient was taken to the operating room and placed in supine position on the operating table. The right hand was prepped and draped in the usual sterile fashion.  A timeout was performed with all OR staff.  The patient name, MRN, operative extremity, procedure, allergies, antibiotics, DVT prophylaxis, and fire precautions/plan were reviewed, and all were in agreement.     A tournicot was placed at the base of the right ring finger. An oblique incision was made over the mass which was just distal to the volar PIP joint. Dissection was carried through the subcutaneous tissues. The mass was identified and  from adjacent tissue along an areolar plane circumferentially.  The mass was completely resected and sent to pathology.  The wound was irrigated.  Closure was performed with 4-0 Monocryl suture.  Soft dressings were applied.  The tournicot was released.  Capillary refill was intact, < 2 seconds.     All counts were correct at the end of the case.       There were no complications.    POSTOPERATIVE PLAN: return to clinic in 1 week for wound check    SVEN ELLER MD

## 2024-12-11 LAB
PATH REPORT.COMMENTS IMP SPEC: NORMAL
PATH REPORT.COMMENTS IMP SPEC: NORMAL
PATH REPORT.FINAL DX SPEC: NORMAL
PATH REPORT.GROSS SPEC: NORMAL
PATH REPORT.MICROSCOPIC SPEC OTHER STN: NORMAL
PATH REPORT.RELEVANT HX SPEC: NORMAL
PHOTO IMAGE: NORMAL

## 2024-12-11 NOTE — PROGRESS NOTES
Hand Surgery History & Physical    REFERRING PHYSICIAN: Shilo Kapadia   PRIMARY CARE PHYSICIAN: Shilo Kapadia     Chief Complaint:   Surgical Followup (Post op Right ring finger mass excision - Right. DOS: 12/10/24)    Diagnosis: right ring finger mass  Surgery: 12/10/2024: right ring finger mass excision    History of Present Illness:     Zeina Elaine is a 33 year old right-hand dominant male who presents for evaluation of right ring finger mass.  Patient reports that this has been present for several years.  Has not changed in size.  He has minimal pain at rest.  He has some discomfort when gripping or squeezing items.  He works as a  and it does not bother him much at work.    Patient was previously seen by Dr. Garcia primary care sports medicine where an ultrasound, MRI were obtained.  Patient also underwent attempted ultrasound aspiration without success.  Patient notes that he had some increased soreness after the aspiration attempt, but this is since resolved.  Patient was referred here for further evaluation and discussion of treatment options.  Per documentation by Dr. Garcia, bedside ultrasound demonstrated a hypoechoic mass without evidence of blood flow.    12/18/2024: first postoperative visit. Reports no significant pain in the finger, no numbness/tingling    Occupation: .     Past Medical History:   No past medical history on file.    Patient Active Problem List   Diagnosis    Nodule of finger of right hand         Physical Exam:   Physical Exam Adult:    Skin incision healing well  No evidence of mass recurrence  Neurovascular- intact light touch sensation and motor to distribution of the median, ulnar and radial nerves. Brisk capillary refill to the distal fingers.   Range of Motion: Full range of motion of the digit.  Able to make a full composite fist.  Muscle strength and tone: Fires FDP FDS and extensor to the ring finger without difficulty.    Imaging:   Pathology  obtained 12/10/2024:   Soft tissue, right finger, excision:  - Schwannoma    3 view X-ray and MRI of the right ring finger was independently interpreted by me. The results were discussed with the patient.  Findings include: 2.8 mm enhancing fluid-filled nodule over the radial aspect of the PIP of the fourth finger.  Per formal MRI read, differential diagnosis includes a vascular malformation, tenosynovial giant cell tumor, and less likely glomus tumor given the atypical location.     Assessment and Plan:   Assessment:  33 year old male with right ring finger PIP mass, now s/p excision     Plan: I had a discussion with the patient regarding my clinical findings, diagnosis, and treatment plan. We reviewed the post-operative plan, frequency of follow-up, rehabilitation, and expected outcomes.  All questions answered.     PWB (< 5 lbs) right upper extremity. Advance as tolerated.   OK to wash hands/shower.  Do not submerge incision until 4 weeks from the date of surgery.    Next Visit:   Follow-up: as needed    SVEN ELLER MD

## 2024-12-18 ENCOUNTER — OFFICE VISIT (OUTPATIENT)
Dept: ORTHOPEDICS | Facility: CLINIC | Age: 34
End: 2024-12-18
Payer: COMMERCIAL

## 2024-12-18 DIAGNOSIS — R22.31 NODULE OF FINGER OF RIGHT HAND: Primary | ICD-10-CM

## 2024-12-18 PROCEDURE — 99024 POSTOP FOLLOW-UP VISIT: CPT | Performed by: STUDENT IN AN ORGANIZED HEALTH CARE EDUCATION/TRAINING PROGRAM

## 2024-12-18 NOTE — NURSING NOTE
Reason For Visit:   Chief Complaint   Patient presents with    Surgical Followup     Post op Right ring finger mass excision - Right. DOS: 12/10/24       Primary MD: Shilo Kapadia  Ref. MD: Tasha    Age: 34 year old    ?  No      There were no vitals taken for this visit.      Pain Assessment  Patient Currently in Pain: No    Hand Dominance Evaluation  Hand Dominance: Right          QuickDASH Assessment      3/31/2024    12:07 PM   QuickDASH Main   1. Open a tight or new jar No difficulty   2. Do heavy household chores (e.g., wash walls, floors) No difficulty   3. Carry a shopping bag or briefcase No difficulty   4. Wash your back No difficulty   5. Use a knife to cut food No difficulty   6. Recreational activities in which you take some force or impact through your arm, shoulder or hand (e.g., golf, hammering, tennis, etc.) No difficulty   7. During the past week, to what extent has your arm, shoulder or hand problem interfered with your normal social activities with family, friends, neighbours or groups Not at all   8. During the past week, were you limited in your work or other regular daily activities as a result of your arm, shoulder or hand problem Not limited at all   9. Arm, shoulder or hand pain None   10.Tingling (pins and needles) in your arm,shoulder or hand None   11. During the past week, how much difficulty have you had sleeping because of the pain in your arm, shoulder or hand No difficulty   Quickdash Ability Score 0          No current outpatient medications on file.       No Known Allergies    Heidi Rothman, ATC

## 2024-12-18 NOTE — LETTER
12/18/2024      Zeina Elaine  [address] [place] [address]      Dear Colleague,    Thank you for referring your patient, Zeina Elaine, to the [facility] [place]. Please see a copy of my visit note below.    Hand Surgery History & Physical    REFERRING PHYSICIAN: Shilo Kapadia   PRIMARY CARE PHYSICIAN: Shilo Kapadia     Chief Complaint:   Surgical Followup (Post op Right ring finger mass excision - Right. DOS: 12/10/24)    Diagnosis: right ring finger mass  Surgery: 12/10/2024: right ring finger mass excision    History of Present Illness:     Zeina Elaine is a 33 year old right-hand dominant male who presents for evaluation of right ring finger mass.  Patient reports that this has been present for several years.  Has not changed in size.  He has minimal pain at rest.  He has some discomfort when gripping or squeezing items.  He works as a  and it does not bother him much at work.    Patient was previously seen by Dr. Garcia primary care sports medicine where an ultrasound, MRI were obtained.  Patient also underwent attempted ultrasound aspiration without success.  Patient notes that he had some increased soreness after the aspiration attempt, but this is since resolved.  Patient was referred here for further evaluation and discussion of treatment options.  Per documentation by Dr. Garcia, bedside ultrasound demonstrated a hypoechoic mass without evidence of blood flow.    12/18/2024: first postoperative visit. Reports no significant pain in the finger, no numbness/tingling    Occupation: .     Past Medical History:   No past medical history on file.    Patient Active Problem List   Diagnosis     Nodule of finger of right hand         Physical Exam:   Physical Exam Adult:    Skin incision healing well  No evidence of mass recurrence  Neurovascular- intact light touch sensation and motor to distribution of the median, ulnar and radial nerves. Brisk capillary refill  to the distal fingers.   Range of Motion: Full range of motion of the digit.  Able to make a full composite fist.  Muscle strength and tone: Fires FDP FDS and extensor to the ring finger without difficulty.    Imaging:   Pathology obtained 12/10/2024:   Soft tissue, right finger, excision:  - Schwannoma    3 view X-ray and MRI of the right ring finger was independently interpreted by me. The results were discussed with the patient.  Findings include: 2.8 mm enhancing fluid-filled nodule over the radial aspect of the PIP of the fourth finger.  Per formal MRI read, differential diagnosis includes a vascular malformation, tenosynovial giant cell tumor, and less likely glomus tumor given the atypical location.     Assessment and Plan:   Assessment:  33 year old male with right ring finger PIP mass, now s/p excision     Plan: I had a discussion with the patient regarding my clinical findings, diagnosis, and treatment plan. We reviewed the post-operative plan, frequency of follow-up, rehabilitation, and expected outcomes.  All questions answered.      PWB (< 5 lbs) right upper extremity. Advance as tolerated.    OK to wash hands/shower.  Do not submerge incision until 4 weeks from the date of surgery.    Next Visit:    Follow-up: as needed    SVEN ELLER MD        Again, thank you for allowing me to participate in the care of your patient.        Sincerely,        SVEN ELLER MD

## 2025-01-16 ENCOUNTER — OFFICE VISIT (OUTPATIENT)
Dept: ORTHOPEDICS | Facility: CLINIC | Age: 35
End: 2025-01-16
Payer: COMMERCIAL

## 2025-01-16 ENCOUNTER — ANCILLARY PROCEDURE (OUTPATIENT)
Dept: GENERAL RADIOLOGY | Facility: CLINIC | Age: 35
End: 2025-01-16
Attending: FAMILY MEDICINE
Payer: COMMERCIAL

## 2025-01-16 VITALS — HEIGHT: 72 IN | BODY MASS INDEX: 24.14 KG/M2 | WEIGHT: 178.2 LBS

## 2025-01-16 DIAGNOSIS — M25.511 RIGHT SHOULDER PAIN: ICD-10-CM

## 2025-01-16 DIAGNOSIS — G89.29 CHRONIC RIGHT SHOULDER PAIN: Primary | ICD-10-CM

## 2025-01-16 DIAGNOSIS — R22.31 NODULE OF FINGER OF RIGHT HAND: ICD-10-CM

## 2025-01-16 DIAGNOSIS — M25.511 CHRONIC RIGHT SHOULDER PAIN: Primary | ICD-10-CM

## 2025-01-16 NOTE — PROGRESS NOTES
ASSESSMENT & PLAN    Zeina was seen today for pain.    Diagnoses and all orders for this visit:    Chronic right shoulder pain  -     XR Shoulder Right G/E 3 Views; Future    Nodule of finger of right hand      This issue is acute on chronic and Worsening.    # Chronic Right Shoulder Pain: Zeina Elaine  was seen today for right shoulder pain. Symptoms had been going on for 4 mon in the setting of being a . On examination there are positive findings of tenderness to palpation over the right rotator cuff tendon, pain no weakness on rotator cuff tendon. Imaging findings showed no arthritis. Likely cause of patient's condition due to irritated rotator cuff tendon. Other possible conditions contributing to symptoms include biceps tendon irritation.  Counseled patient on nature of condition and treatment options.  Given this plan as below, follow-up 1 mon as needed.     # Right 4th Digit Mass status post excision: Patient had mass removed on 12/10/24 with improvement of swelling but some persisting. Will have him follow-up with surgeon for repeat evaluation, guidance.  Image Findings: negative right shoulder x-rays  Treatment: Activities as tolerated, home exercises given today  Job: As tolerated  Medications/Injections: Limited tylenol/ibuprofen for pain for 1-2 weeks, Topical Voltaren gel, none today  Follow-up: In one month if symptoms do not improve, sooner if worsening  Can consider formal PT, steroid injection       Artem Garcia MD  [facility] [place]    -----  Chief Complaint   Patient presents with    Right Shoulder - Pain       SUBJECTIVE  Zeina Elaine is a/an 34 year old male who is seen as a self referral for evaluation of right shoulder pain.     The patient is seen by themselves.  The patient is Right handed    Onset: 3-4 month(s) ago. Reports insidious onset without acute precipitating event.  Location of Pain: right anterior shoulder   Worsened by:increase activity   Better  "with: rest, activity avoidance   Treatments tried: rest/activity avoidance and massage gun, lidocaine patch   Associated symptoms: no distal numbness or tingling; denies swelling or warmth    Orthopedic/Surgical history: NO  Social History/Occupation: Owns restaurant     No family history pertinent to patient's problem today.      REVIEW OF SYSTEMS:  Review of Systems  Constitutional, HEENT, cardiovascular, pulmonary, gi and gu systems are negative, except as otherwise noted.    OBJECTIVE:  Ht 1.816 m (5' 11.5\")   Wt 80.8 kg (178 lb 3.2 oz)   BMI 24.51 kg/m     General: healthy, alert and in no distress  HEENT: no scleral icterus or conjunctival erythema  Skin: no suspicious lesions or rash. No jaundice.  CV: distal perfusion intact    Resp: normal respiratory effort without conversational dyspnea   Psych: normal mood and affect  Gait: normal steady gait with appropriate coordination and balance    Neuro: Normal light sensory exam of right upper extremity      Ortho Exam   RIGHT SHOULDER  Inspection:    no swelling, bruising, discoloration, or obvious deformity or asymmetry  Palpation:    Tender about the supraspinatus insertion. Remainder of bony and tendinous landmarks are nontender.  Active Range of Motion:     Abduction 1650, FF 1650, , IR L1.    Strength:    Scapular plane abduction 5/5,  ER 5/5, IR 5/5, biceps 5/5, triceps 5/5  Special Tests:    Positive: supraspinatus (empty can)    Negative: Neer's and Toscano'    CERVICAL SPINE  Inspection:    normal cervical lordosis present, rounded shoulders, forward head posture  Palpation:    Nontender.  Range of Motion:     Flexion full    Extension full    Right side bend full    Left side bend full    Right rotation full    Left rotation full  Strength:    Full strength throughout all neck muscles  Special Tests:    Positive: None    Negative: Spurling's (bilateral)    Right Hand: Small swelling over volar 4th digit at PIP joint     RADIOLOGY:  I " independently ordered, visualized and reviewed these images with the patient  Normal right shoulder x-rays      Review of external notes as documented elsewhere in note  Review of the result(s) of each unique test - right shoulder x-rays       Disclaimer: This note consists of symbols derived from keyboarding, dictation and/or voice recognition software. As a result, there may be errors in the script that have gone undetected. Please consider this when interpreting information found in this chart.

## 2025-01-16 NOTE — LETTER
1/16/2025      Zeina Elaine  [address]  [place] [address]      Dear Colleague,    Thank you for referring your patient, Zeina Elaine, to the [facility] [place]. Please see a copy of my visit note below.    ASSESSMENT & PLAN    Zeina was seen today for pain.    Diagnoses and all orders for this visit:    Chronic right shoulder pain  -     XR Shoulder Right G/E 3 Views; Future    Nodule of finger of right hand      This issue is acute on chronic and Worsening.    # Chronic Right Shoulder Pain: Zeina Elaine  was seen today for right shoulder pain. Symptoms had been going on for 4 mon in the setting of being a . On examination there are positive findings of tenderness to palpation over the right rotator cuff tendon, pain no weakness on rotator cuff tendon. Imaging findings showed no arthritis. Likely cause of patient's condition due to irritated rotator cuff tendon. Other possible conditions contributing to symptoms include biceps tendon irritation.  Counseled patient on nature of condition and treatment options.  Given this plan as below, follow-up 1 mon as needed.     # Right 4th Digit Mass status post excision: Patient had mass removed on 12/10/24 with improvement of swelling but some persisting. Will have him follow-up with surgeon for repeat evaluation, guidance.  Image Findings: negative right shoulder x-rays  Treatment: Activities as tolerated, home exercises given today  Job: As tolerated  Medications/Injections: Limited tylenol/ibuprofen for pain for 1-2 weeks, Topical Voltaren gel, none today  Follow-up: In one month if symptoms do not improve, sooner if worsening  Can consider formal PT, steroid injection       Artem Garcia MD  [facility] [place]    -----  Chief Complaint   Patient presents with     Right Shoulder - Pain       SUBJECTIVE  Zeina Elaine is a/an 34 year old male who is seen as a self referral for evaluation of right shoulder pain.  "    The patient is seen by themselves.  The patient is Right handed    Onset: 3-4 month(s) ago. Reports insidious onset without acute precipitating event.  Location of Pain: right anterior shoulder   Worsened by:increase activity   Better with: rest, activity avoidance   Treatments tried: rest/activity avoidance and massage gun, lidocaine patch   Associated symptoms: no distal numbness or tingling; denies swelling or warmth    Orthopedic/Surgical history: NO  Social History/Occupation: Owns restaurant     No family history pertinent to patient's problem today.      REVIEW OF SYSTEMS:  Review of Systems  Constitutional, HEENT, cardiovascular, pulmonary, gi and gu systems are negative, except as otherwise noted.    OBJECTIVE:  Ht 1.816 m (5' 11.5\")   Wt 80.8 kg (178 lb 3.2 oz)   BMI 24.51 kg/m     General: healthy, alert and in no distress  HEENT: no scleral icterus or conjunctival erythema  Skin: no suspicious lesions or rash. No jaundice.  CV: distal perfusion intact    Resp: normal respiratory effort without conversational dyspnea   Psych: normal mood and affect  Gait: normal steady gait with appropriate coordination and balance    Neuro: Normal light sensory exam of right upper extremity      Ortho Exam   RIGHT SHOULDER  Inspection:    no swelling, bruising, discoloration, or obvious deformity or asymmetry  Palpation:    Tender about the supraspinatus insertion. Remainder of bony and tendinous landmarks are nontender.  Active Range of Motion:     Abduction 1650, FF 1650, , IR L1.    Strength:    Scapular plane abduction 5/5,  ER 5/5, IR 5/5, biceps 5/5, triceps 5/5  Special Tests:    Positive: supraspinatus (empty can)    Negative: Neer's and Toscano'    CERVICAL SPINE  Inspection:    normal cervical lordosis present, rounded shoulders, forward head posture  Palpation:    Nontender.  Range of Motion:     Flexion full    Extension full    Right side bend full    Left side bend full    Right rotation full    " Left rotation full  Strength:    Full strength throughout all neck muscles  Special Tests:    Positive: None    Negative: Spurling's (bilateral)    Right Hand: Small swelling over volar 4th digit at PIP joint     RADIOLOGY:  I independently ordered, visualized and reviewed these images with the patient  Normal right shoulder x-rays      Review of external notes as documented elsewhere in note  Review of the result(s) of each unique test - right shoulder x-rays       Disclaimer: This note consists of symbols derived from keyboarding, dictation and/or voice recognition software. As a result, there may be errors in the script that have gone undetected. Please consider this when interpreting information found in this chart.      Again, thank you for allowing me to participate in the care of your patient.        Sincerely,        Artem Garcia MD    Electronically signed

## 2025-01-16 NOTE — PATIENT INSTRUCTIONS
# Chronic Right Shoulder Pain: Zeina Elaine  was seen today for right shoulder pain. Symptoms had been going on for 4 mon in the setting of being a . On examination there are positive findings of tenderness to palpation over the right rotator cuff tendon, pain no weakness on rotator cuff tendon. Imaging findings showed no arthritis. Likely cause of patient's condition due to irritated rotator cuff tendon. Other possible conditions contributing to symptoms include biceps tendon irritation.  Counseled patient on nature of condition and treatment options.  Given this plan as below, follow-up 1 mon as needed.     # Right 4th Digit Mass status post excision: Patient had mass removed on 12/10/24 with improvement of swelling but some persisting. Will have him follow-up with surgeon for repeat evaluation, guidance.  Image Findings: negative right shoulder x-rays  Treatment: Activities as tolerated, home exercises given today  Job: As tolerated  Medications/Injections: Limited tylenol/ibuprofen for pain for 1-2 weeks, Topical Voltaren gel, none today  Follow-up: In one month if symptoms do not improve, sooner if worsening  Can consider formal PT, steroid injection     Please call [phone]   Ask for my team if you have any questions or concerns    If you have not yet received the influenza vaccine but would like to get one, please call  [phone] or you can schedule via true[x] Media    It was great seeing you again today!    Artem Garcia MD, [facility]

## 2025-01-30 ENCOUNTER — OFFICE VISIT (OUTPATIENT)
Dept: FAMILY MEDICINE | Facility: CLINIC | Age: 35
End: 2025-01-30
Payer: COMMERCIAL

## 2025-01-30 VITALS
OXYGEN SATURATION: 99 % | HEIGHT: 71 IN | BODY MASS INDEX: 24.22 KG/M2 | DIASTOLIC BLOOD PRESSURE: 86 MMHG | SYSTOLIC BLOOD PRESSURE: 130 MMHG | HEART RATE: 80 BPM | WEIGHT: 173 LBS | TEMPERATURE: 98.5 F | RESPIRATION RATE: 20 BRPM

## 2025-01-30 DIAGNOSIS — R03.0 PREHYPERTENSION: ICD-10-CM

## 2025-01-30 DIAGNOSIS — M25.511 CHRONIC RIGHT SHOULDER PAIN: ICD-10-CM

## 2025-01-30 DIAGNOSIS — Z78.9 ALCOHOL USE: ICD-10-CM

## 2025-01-30 DIAGNOSIS — Z13.220 LIPID SCREENING: ICD-10-CM

## 2025-01-30 DIAGNOSIS — L65.9 ALOPECIA: ICD-10-CM

## 2025-01-30 DIAGNOSIS — R53.83 FATIGUE, UNSPECIFIED TYPE: ICD-10-CM

## 2025-01-30 DIAGNOSIS — F17.200 TOBACCO USE DISORDER: ICD-10-CM

## 2025-01-30 DIAGNOSIS — Z13.1 SCREENING FOR DIABETES MELLITUS: ICD-10-CM

## 2025-01-30 DIAGNOSIS — Z00.00 ROUTINE GENERAL MEDICAL EXAMINATION AT A HEALTH CARE FACILITY: Primary | ICD-10-CM

## 2025-01-30 DIAGNOSIS — G89.29 CHRONIC RIGHT SHOULDER PAIN: ICD-10-CM

## 2025-01-30 LAB
ERYTHROCYTE [DISTWIDTH] IN BLOOD BY AUTOMATED COUNT: 11.8 % (ref 10–15)
EST. AVERAGE GLUCOSE BLD GHB EST-MCNC: 105 MG/DL
HBA1C MFR BLD: 5.3 % (ref 0–5.6)
HCT VFR BLD AUTO: 44.1 % (ref 40–53)
HGB BLD-MCNC: 14.7 G/DL (ref 13.3–17.7)
MCH RBC QN AUTO: 31.7 PG (ref 26.5–33)
MCHC RBC AUTO-ENTMCNC: 33.3 G/DL (ref 31.5–36.5)
MCV RBC AUTO: 95 FL (ref 78–100)
PLATELET # BLD AUTO: 225 10E3/UL (ref 150–450)
RBC # BLD AUTO: 4.64 10E6/UL (ref 4.4–5.9)
WBC # BLD AUTO: 5.6 10E3/UL (ref 4–11)

## 2025-01-30 PROCEDURE — 85027 COMPLETE CBC AUTOMATED: CPT | Performed by: PHYSICIAN ASSISTANT

## 2025-01-30 PROCEDURE — 99213 OFFICE O/P EST LOW 20 MIN: CPT | Mod: 25 | Performed by: PHYSICIAN ASSISTANT

## 2025-01-30 PROCEDURE — 84443 ASSAY THYROID STIM HORMONE: CPT | Performed by: PHYSICIAN ASSISTANT

## 2025-01-30 PROCEDURE — 36415 COLL VENOUS BLD VENIPUNCTURE: CPT | Performed by: PHYSICIAN ASSISTANT

## 2025-01-30 PROCEDURE — 80061 LIPID PANEL: CPT | Performed by: PHYSICIAN ASSISTANT

## 2025-01-30 PROCEDURE — 83036 HEMOGLOBIN GLYCOSYLATED A1C: CPT | Performed by: PHYSICIAN ASSISTANT

## 2025-01-30 PROCEDURE — 80053 COMPREHEN METABOLIC PANEL: CPT | Performed by: PHYSICIAN ASSISTANT

## 2025-01-30 PROCEDURE — 99395 PREV VISIT EST AGE 18-39: CPT | Mod: 24 | Performed by: PHYSICIAN ASSISTANT

## 2025-01-30 SDOH — HEALTH STABILITY: PHYSICAL HEALTH: ON AVERAGE, HOW MANY DAYS PER WEEK DO YOU ENGAGE IN MODERATE TO STRENUOUS EXERCISE (LIKE A BRISK WALK)?: 1 DAY

## 2025-01-30 ASSESSMENT — ENCOUNTER SYMPTOMS
FEVER: 0
BLURRED VISION: 0
EYE PAIN: 0
NAUSEA: 0
VOMITING: 0
EYE REDNESS: 0
PHOTOPHOBIA: 0
EYE DISCHARGE: 0
SHORTNESS OF BREATH: 0
WEAKNESS: 0
DIZZINESS: 0
SORE THROAT: 0
DOUBLE VISION: 0

## 2025-01-30 ASSESSMENT — SOCIAL DETERMINANTS OF HEALTH (SDOH): HOW OFTEN DO YOU GET TOGETHER WITH FRIENDS OR RELATIVES?: ONCE A WEEK

## 2025-01-30 ASSESSMENT — PAIN SCALES - GENERAL: PAINLEVEL_OUTOF10: NO PAIN (0)

## 2025-01-30 NOTE — PROGRESS NOTES
Preventive Care Visit  [facility]  YOEL MCDERMOTT PA-C, Physician Assistant - Medical  Jan 30, 2025      Assessment & Plan     Routine general medical examination at a health care facility  Zeina Elaine is a 34M presenting for his annual preventative visit today. He would like blood work completed. He had some concerns about his right shoulder whom he has seen orthopedics for before. We sent a PT referral. He is no longer concerned about a mass that was on his right hand as it has been surgically removed. He has some alopecia and has a appointment for intralesional steroid injections, we also sent a dermatology referral. He admits to daily alcohol use to help sleep and chronic fatigue. We ordered some labs to assess fro additional causes of fatigue. He has been advised to decrease his alcohol use as it may be leading to his fatigue. He will follow up when and if he is ready to decrease his alcohol use and consider a sleeping aide. He denies STD concerns and declines vaccinations.     Lipid screening  - Lipid panel reflex to direct LDL Fasting    Screening for diabetes mellitus  - Hemoglobin A1c    Prehypertension    Tobacco use disorder    Alcohol use  - Comprehensive metabolic panel (BMP + Alb, Alk Phos, ALT, AST, Total. Bili, TP); Future  - Comprehensive metabolic panel (BMP + Alb, Alk Phos, ALT, AST, Total. Bili, TP)    Alopecia  - Adult Dermatology  Referral; Future    Chronic right shoulder pain  - Physical Therapy  Referral; Future    Fatigue, unspecified type  - CBC with platelets  - TSH with free T4 reflex    See results documentation for follow up of this visit.       Nicotine/Tobacco Cessation  He reports that he has been smoking cigarettes. He has never been exposed to tobacco smoke. He has never used smokeless tobacco.  Nicotine/Tobacco Cessation Plan  Information offered: Patient not interested at this time      Counseling  Appropriate preventive services were  "addressed with this patient via screening, questionnaire, or discussion as appropriate for fall prevention, nutrition, physical activity, Tobacco-use cessation, social engagement, weight loss and cognition.  Checklist reviewing preventive services available has been given to the patient.  Reviewed patient's diet, addressing concerns and/or questions.   He is at risk for lack of exercise and has been provided with information to increase physical activity for the benefit of his well-being.   He is at risk for psychosocial distress and has been provided with information to reduce risk.   The patient reports drinking more than 3 alcoholic drinks per day and/or more than 7 drhnks per week. The patient was counseled and given information about possible harmful effects of excessive alcohol intake.      Win Pastrana is a 34 year old, presenting for the following:  Physical        1/30/2025    10:08 AM   Additional Questions   Roomed by tg   Accompanied by self         1/30/2025    10:08 AM   Patient Reported Additional Medications   Patient reports taking the following new medications none         Well Male Exam:    Last Exam Date: April 2024    Prostate Cancer Screening: No urinary symptoms    STD Screening: Not interested   Colon Cancer Screening: N/A     Smoker: Cigarettes   Interested in Smoking Cessation: No   Alcohol Use: Whiskey at night to \"help sleep\", social drinking     Immunizations: Declined     Diet: \"Fine\"   Exercise: Does not exercise   Supplements: B12, D3, probiotics   Cholesterol Screening: Completed today   Diabetes Screening: Completed today     Depression and Anxiety Screening: \"Fine\"     Other Concerns:     Has been having right sided shoulder pain for 3 months with limited range of motion. He saw orthopedics 2 weeks ago who imaged his shoulder and reported it was \"fine\". He would like a PT referral.     At the time of scheduling his appointment he had a right handed finger mass which has " since been operated on and is healing well. No concerns.     Hair loss - he has an appointment for intralesional steroid injections in March which have been helpful in the past. He would like a dermatology referral so he has an appointment on the books.     He has been having nosebleeds often. Has tried a humidifier which has not been very helpful.        Health Care Directive  Patient does not have a Health Care Directive: Discussed advance care planning with patient; information given to patient to review.      1/30/2025   General Health   How would you rate your overall physical health? Good   Feel stress (tense, anxious, or unable to sleep) To some extent   (!) STRESS CONCERN      1/30/2025   Nutrition   Three or more servings of calcium each day? Yes   Diet: Regular (no restrictions)   How many servings of fruit and vegetables per day? (!) 0-1   How many sweetened beverages each day? 0-1         1/30/2025   Exercise   Days per week of moderate/strenous exercise 1 day   (!) EXERCISE CONCERN      1/30/2025   Social Factors   Frequency of gathering with friends or relatives Once a week   Worry food won't last until get money to buy more No   Food not last or not have enough money for food? No   Do you have housing? (Housing is defined as stable permanent housing and does not include staying ouside in a car, in a tent, in an abandoned building, in an overnight shelter, or couch-surfing.) Yes   Are you worried about losing your housing? No   Lack of transportation? No   Unable to get utilities (heat,electricity)? No         1/30/2025   Dental   Dentist two times every year? Yes         3/31/2024   TB Screening   Were you born outside of the [place]? Yes         Today's PHQ-2 Score:       1/30/2025    10:03 AM   PHQ-2 ( 1999 [employer])   Q1: Little interest or pleasure in doing things 1   Q2: Feeling down, depressed or hopeless 1   PHQ-2 Score 2    Q1: Little interest or pleasure in doing things Several days   Q2: Feeling  down, depressed or hopeless Several days   PHQ-2 Score 2       Patient-reported           1/30/2025   Substance Use   If I could quit smoking, I would Somewhat agree   I want to quit somking, worry about health affects Somewhat agree   Willing to make a plan to quit smoking Somewhat agree   Willing to cut down before quitting Somewhat agree   Alcohol more than 3/day or more than 7/wk Yes   How often do you have a drink containing alcohol 4 or more times a week   How many alcohol drinks on typical day 1 or 2   How often do you have 5+ drinks at one occasion Weekly   Audit 2/3 Score 3   How often not able to stop drinking once started Never   How often failed to do what normally expected Never   How often needed first drink in am after a heavy drinking session Never   How often feeling of guilt or remorse after drinking Never   How often unable to remember what happened the night before Less than monthly   Have you or someone else been injured because of your drinking No   Has anyone been concerned or suggested you cut down on drinking No   TOTAL SCORE - AUDIT 8   Do you use any other substances recreationally? No     Social History     Tobacco Use    Smoking status: Some Days     Types: Cigarettes     Passive exposure: Never    Smokeless tobacco: Never   Vaping Use    Vaping status: Never Used         1/30/2025   STI Screening   New sexual partner(s) since last STI/HIV test? No         1/30/2025   Contraception/Family Planning   Questions about contraception or family planning No     Reviewed and updated as needed this visit by Provider      No past medical history on file.  Past Surgical History:   Procedure Laterality Date    EXCISE GANGLION PHALANGES Right 12/10/2024    Procedure: Right ring finger mass excision;  Surgeon: Kim Power MD;  Location: [facility] OR     BP Readings from Last 3 Encounters:   01/30/25 130/86   12/10/24 (!) 135/96   04/03/24 131/85    Wt Readings from Last 3 Encounters:   01/30/25 78.5  "kg (173 lb)   01/16/25 80.8 kg (178 lb 3.2 oz)   12/10/24 78 kg (172 lb)                  Patient Active Problem List   Diagnosis    Nodule of finger of right hand     Past Surgical History:   Procedure Laterality Date    EXCISE GANGLION PHALANGES Right 12/10/2024    Procedure: Right ring finger mass excision;  Surgeon: Kim Power MD;  Location: [facility] OR       Social History     Tobacco Use    Smoking status: Some Days     Types: Cigarettes     Passive exposure: Never    Smokeless tobacco: Never   Substance Use Topics    Alcohol use: Not on file     No family history on file.      No current outpatient medications on file.     No Known Allergies  Recent Labs   Lab Test 01/30/25  1105 04/03/24  0947 03/01/24  1506 01/26/23  1048   A1C 5.3 5.3  --   --    LDL  --  107*  --  118*   HDL  --  63  --  57   TRIG  --  52  --  105   CR  --  0.76  --  0.77   GFRESTIMATED  --  >90  --  >90   POTASSIUM  --  4.1  --  3.9   TSH  --   --  1.09 0.83        Review of Systems   Constitutional:  Negative for fever.   HENT:  Positive for nosebleeds. Negative for ear discharge, ear pain, hearing loss, sore throat and tinnitus.    Eyes:  Negative for blurred vision, double vision, photophobia, pain, discharge and redness.   Respiratory:  Negative for shortness of breath.    Cardiovascular:  Negative for chest pain.   Gastrointestinal:  Negative for nausea and vomiting.   Skin:  Negative for rash.   Neurological:  Negative for dizziness and weakness.        Objective    Exam  /86   Pulse 80   Temp 98.5  F (36.9  C) (Tympanic)   Resp 20   Ht 1.81 m (5' 11.25\")   Wt 78.5 kg (173 lb)   SpO2 99%   BMI 23.96 kg/m     Estimated body mass index is 23.96 kg/m  as calculated from the following:    Height as of this encounter: 1.81 m (5' 11.25\").    Weight as of this encounter: 78.5 kg (173 lb).    Physical Exam  Constitutional:       General: He is not in acute distress.     Appearance: He is not diaphoretic.   HENT:      Head: " Normocephalic and atraumatic.      Right Ear: Tympanic membrane, ear canal and external ear normal. There is no impacted cerumen.      Left Ear: Tympanic membrane, ear canal and external ear normal. There is no impacted cerumen.      Mouth/Throat:      Mouth: Mucous membranes are moist.      Pharynx: Oropharynx is clear. No oropharyngeal exudate or posterior oropharyngeal erythema.   Eyes:      General: No scleral icterus.        Right eye: No discharge.         Left eye: No discharge.      Extraocular Movements: Extraocular movements intact.      Conjunctiva/sclera: Conjunctivae normal.   Cardiovascular:      Rate and Rhythm: Normal rate and regular rhythm.      Heart sounds: Normal heart sounds.   Pulmonary:      Effort: Pulmonary effort is normal. No respiratory distress.      Breath sounds: Normal breath sounds.   Abdominal:      General: Bowel sounds are normal.      Palpations: Abdomen is soft.      Tenderness: There is no abdominal tenderness. There is no guarding or rebound.   Musculoskeletal:      Cervical back: Neck supple. No tenderness.   Lymphadenopathy:      Cervical: No cervical adenopathy.   Skin:     General: Skin is warm and dry.   Neurological:      General: No focal deficit present.      Mental Status: He is alert and oriented to person, place, and time.   Psychiatric:         Mood and Affect: Mood normal.         Behavior: Behavior normal.       Documentation written by Alexis Myhre, PA-S2, under my supervision. I certify that her documentation is correct and appropriately represents the visit events.     Signed Electronically by: AURA ATKINSC

## 2025-01-30 NOTE — PATIENT INSTRUCTIONS
Patient Education   Preventive Care Advice   This is general advice given by our system to help you stay healthy. However, your care team may have specific advice just for you. Please talk to your care team about your preventive care needs.  Nutrition  Eat 5 or more servings of fruits and vegetables each day.  Try wheat bread, brown rice and whole grain pasta (instead of white bread, rice, and pasta).  Get enough calcium and vitamin D. Check the label on foods and aim for 100% of the RDA (recommended daily allowance).  Lifestyle  Exercise at least 150 minutes each week  (30 minutes a day, 5 days a week).  Do muscle strengthening activities 2 days a week. These help control your weight and prevent disease.  No smoking.  Wear sunscreen to prevent skin cancer.  Have a dental exam and cleaning every 6 months.  Yearly exams  See your health care team every year to talk about:  Any changes in your health.  Any medicines your care team has prescribed.  Preventive care, family planning, and ways to prevent chronic diseases.  Shots (vaccines)   HPV shots (up to age 26), if you've never had them before.  Hepatitis B shots (up to age 59), if you've never had them before.  COVID-19 shot: Get this shot when it's due.  Flu shot: Get a flu shot every year.  Tetanus shot: Get a tetanus shot every 10 years.  Pneumococcal, hepatitis A, and RSV shots: Ask your care team if you need these based on your risk.  Shingles shot (for age 50 and up)  General health tests  Diabetes screening:  Starting at age 35, Get screened for diabetes at least every 3 years.  If you are younger than age 35, ask your care team if you should be screened for diabetes.  Cholesterol test: At age 39, start having a cholesterol test every 5 years, or more often if advised.  Bone density scan (DEXA): At age 50, ask your care team if you should have this scan for osteoporosis (brittle bones).  Hepatitis C: Get tested at least once in your life.  STIs (sexually  transmitted infections)  Before age 24: Ask your care team if you should be screened for STIs.  After age 24: Get screened for STIs if you're at risk. You are at risk for STIs (including HIV) if:  You are sexually active with more than one person.  You don't use condoms every time.  You or a partner was diagnosed with a sexually transmitted infection.  If you are at risk for HIV, ask about PrEP medicine to prevent HIV.  Get tested for HIV at least once in your life, whether you are at risk for HIV or not.  Cancer screening tests  Cervical cancer screening: If you have a cervix, begin getting regular cervical cancer screening tests starting at age 21.  Breast cancer scan (mammogram): If you've ever had breasts, begin having regular mammograms starting at age 40. This is a scan to check for breast cancer.  Colon cancer screening: It is important to start screening for colon cancer at age 45.  Have a colonoscopy test every 10 years (or more often if you're at risk) Or, ask your provider about stool tests like a FIT test every year or Cologuard test every 3 years.  To learn more about your testing options, visit:   .  For help making a decision, visit:   https://bit.ly/pd89964.  Prostate cancer screening test: If you have a prostate, ask your care team if a prostate cancer screening test (PSA) at age 55 is right for you.  Lung cancer screening: If you are a current or former smoker ages 50 to 80, ask your care team if ongoing lung cancer screenings are right for you.  For informational purposes only. Not to replace the advice of your health care provider. Copyright   2023 [employer]. All rights reserved. Clinically reviewed by the [facility] Transitions Program. [employer] [id number] - REV 01/24.

## 2025-01-31 LAB
ALBUMIN SERPL BCG-MCNC: 4.5 G/DL (ref 3.5–5.2)
ALP SERPL-CCNC: 57 U/L (ref 40–150)
ALT SERPL W P-5'-P-CCNC: 25 U/L (ref 0–70)
ANION GAP SERPL CALCULATED.3IONS-SCNC: 10 MMOL/L (ref 7–15)
AST SERPL W P-5'-P-CCNC: 21 U/L (ref 0–45)
BILIRUB SERPL-MCNC: 0.8 MG/DL
BUN SERPL-MCNC: 16.4 MG/DL (ref 6–20)
CALCIUM SERPL-MCNC: 9.4 MG/DL (ref 8.8–10.4)
CHLORIDE SERPL-SCNC: 104 MMOL/L (ref 98–107)
CHOLEST SERPL-MCNC: 184 MG/DL
CREAT SERPL-MCNC: 0.71 MG/DL (ref 0.67–1.17)
EGFRCR SERPLBLD CKD-EPI 2021: >90 ML/MIN/1.73M2
FASTING STATUS PATIENT QL REPORTED: YES
FASTING STATUS PATIENT QL REPORTED: YES
GLUCOSE SERPL-MCNC: 96 MG/DL (ref 70–99)
HCO3 SERPL-SCNC: 27 MMOL/L (ref 22–29)
HDLC SERPL-MCNC: 50 MG/DL
LDLC SERPL CALC-MCNC: 113 MG/DL
NONHDLC SERPL-MCNC: 134 MG/DL
POTASSIUM SERPL-SCNC: 4.5 MMOL/L (ref 3.4–5.3)
PROT SERPL-MCNC: 7.2 G/DL (ref 6.4–8.3)
SODIUM SERPL-SCNC: 141 MMOL/L (ref 135–145)
TRIGL SERPL-MCNC: 103 MG/DL
TSH SERPL DL<=0.005 MIU/L-ACNC: 0.85 UIU/ML (ref 0.3–4.2)

## 2025-02-03 ENCOUNTER — THERAPY VISIT (OUTPATIENT)
Dept: PHYSICAL THERAPY | Facility: CLINIC | Age: 35
End: 2025-02-03
Attending: PHYSICIAN ASSISTANT
Payer: COMMERCIAL

## 2025-02-03 DIAGNOSIS — G89.29 CHRONIC RIGHT SHOULDER PAIN: ICD-10-CM

## 2025-02-03 DIAGNOSIS — M25.511 CHRONIC RIGHT SHOULDER PAIN: ICD-10-CM

## 2025-02-03 PROCEDURE — 97161 PT EVAL LOW COMPLEX 20 MIN: CPT | Mod: GP | Performed by: PHYSICAL THERAPIST

## 2025-02-03 PROCEDURE — 97110 THERAPEUTIC EXERCISES: CPT | Mod: GP | Performed by: PHYSICAL THERAPIST

## 2025-02-03 ASSESSMENT — ACTIVITIES OF DAILY LIVING (ADL)
CARRYING_A_HEAVY_OBJECT_OF_10_POUNDS: 3
PUTTING_ON_AN_UNDERSHIRT_OR_A_PULLOVER_SWEATER: 7
REMOVING_SOMETHING_FROM_YOUR_BACK_POCKET: 6
WASHING_YOUR_BACK: 6
WHEN_LYING_ON_THE_INVOLVED_SIDE: 5
PLEASE_INDICATE_YOR_PRIMARY_REASON_FOR_REFERRAL_TO_THERAPY:: SHOULDER
PUSHING_WITH_THE_INVOLVED_ARM: 5
PUTTING_ON_A_SHIRT_THAT_BUTTONS_DOWN_THE_FRONT: 2
REACHING_FOR_SOMETHING_ON_A_HIGH_SHELF: 7
AT_ITS_WORST?: 5
TOUCHING_THE_BACK_OF_YOUR_NECK: 3
WASHING_YOUR_HAIR?: 2
PUTTING_ON_YOUR_PANTS: 1
PLACING_AN_OBJECT_ON_A_HIGH_SHELF: 5

## 2025-02-03 NOTE — PROGRESS NOTES
PHYSICAL THERAPY EVALUATION  Type of Visit: Evaluation    See electronic medical record for Abuse and Falls Screening details.            Subjective     REFERRAL DX:  Chronic right shoulder pain  1/30/25    SUBJECTIVE HISTORY: Hx of R shoulder pain of 4+ months. Works as a . Painful to reach behind the back, at times cutting food/sushi. Tried a few exercises that he found online, but still not 100% (mostly stretches). Massage seems to help temporarily. Tenderness to palpation over R shoulder but no weakness. No numbness or tingling, though if he has to hold things for a while, can have some tingling into the fingers. Has a 3 yr old and a new born daughter at home.    PAIN:  At best: 0/10  At worst: 8/10  Frequency: with activities   Quality: dull achy pain  Progression:  worsening  Exacerbated by: reaching behind the back (horz abd),  sleeping on the R side  Eased by: stretches, topical        Objective         Presenting condition or subjective complaint: right shoulder  Date of onset: 01/30/25    Relevant medical history:     Dates & types of surgery:      Prior diagnostic imaging/testing results: X-ray       IMPRESSION: Subtle lucency and cortical step-off of the greater tuberosity which may reflect an age indeterminant mildly displaced fracture. Normal joint spaces and alignment.     Prior therapy history for the same diagnosis, illness or injury: No      Living Environment  Social support: With a significant other or spouse   Type of home: House   Stairs to enter the home: Yes 5 Is there a railing: No     Ramp: No   Stairs inside the home: Yes 5 Is there a railing: No     Help at home: Self Cares (home health aide/personal care attendant, family, etc)  Equipment owned:       Employment: Yes [employer]  Hobbies/Interests: read    Patient goals for therapy: reaching back    SHOULDER OBJECTIVE  ROM:   (Degrees) Left AROM Left PROM Right AROM  Right PROM   Shoulder Flexion 180  180    Shoulder Extension  70  30    Shoulder Abduction 180  180    Shoulder Internal Rotation T5  T5    Shoulder External Rotation 75  75      STRENGTH:   Pain: - none + mild ++ moderate +++ severe  Strength Scale: 0-5/5 Left Right   Shoulder Flexion 5 5   Shoulder Abduction 5 5   Shoulder Internal Rotation 5 5   Shoulder External Rotation 5 4+   Elbow Flexion 5 5   Elbow Extension 5 5     SPECIAL TESTS:    Right   Impingement    Neer's Positive   Hawkin's-Henrry Negative    Labral    Anterior Slide Negative    Kemper's Negative    Crank Negative    Instability    Apprehension (anterior) Negative    Relocation (anterior) Negative    Biceps     Speed's Negative    Rotator Cuff Tear    Drop Arm Negative    Belly Press Negative    Lift off  Negative      PALPATION:  bicep tendon  JOINT MOB: GH AP slightly hypermobile      Assessment & Plan   CLINICAL IMPRESSIONS  Medical Diagnosis: R shoulder pain    Treatment Diagnosis: R shoulder pain   Impression/Assessment: Patient is a 34 year old male with R shoulder complaints.  The following significant findings have been identified: Pain, Decreased ROM/flexibility, Decreased strength, and Decreased activity tolerance. These impairments interfere with their ability to perform work tasks, recreational activities, and household chores as compared to previous level of function.     Clinical Decision Making (Complexity):  Clinical Presentation: Stable/Uncomplicated  Clinical Presentation Rationale: based on medical and personal factors listed in PT evaluation  Clinical Decision Making (Complexity): Low complexity    PLAN OF CARE  Treatment Interventions:  Modalities: Cryotherapy, Cupping, Dry Needling, E-stim, Hot Pack  Interventions: Gait Training, Manual Therapy, Neuromuscular Re-education, Therapeutic Activity, Therapeutic Exercise, Self-Care/Home Management    Long Term Goals     PT Goal 1  Goal Description: Patient will demo full R shoulder extension AROM without pain  Rationale: to maximize safety  and independence with performance of ADLs and functional tasks;to maximize safety and independence with self cares  Target Date: 03/31/25  PT Goal 2  Goal Description: 10% improvement on SPADI  Rationale: to maximize safety and independence with performance of ADLs and functional tasks  Target Date: 03/31/25      Frequency of Treatment: 1x/week  Duration of Treatment: 8-12 weeks    Education Assessment:   Learner/Method: Patient  Education Comments: PTRX    Risks and benefits of evaluation/treatment have been explained.   Patient/Family/caregiver agrees with Plan of Care.     Evaluation Time:     PT Eval, Low Complexity Minutes (17705): 10       Signing Clinician: Janee Vallejo PT

## (undated) DEVICE — DRSG ADAPTIC 3X8" 6113

## (undated) DEVICE — PREP CHLORAPREP 26ML TINTED HI-LITE ORANGE 930815

## (undated) DEVICE — PACK MINOR HAND CUSTOM ASC 37-0097A

## (undated) DEVICE — CAST PADDING 3" STERILE 9043S

## (undated) DEVICE — LINEN ORTHO PACK 5446

## (undated) DEVICE — SU MONOCRYL 4-0 PS-2 27" UND Y426H

## (undated) DEVICE — DRSG STERI STRIP 1/2X4" R1547

## (undated) DEVICE — SOL NACL 0.9% IRRIG 500ML BOTTLE 2F7123

## (undated) DEVICE — GLOVE BIOGEL PI MICRO SZ 6.0 48560

## (undated) RX ORDER — BUPIVACAINE HYDROCHLORIDE 5 MG/ML
INJECTION, SOLUTION EPIDURAL; INTRACAUDAL
Status: DISPENSED
Start: 2024-12-10

## (undated) RX ORDER — LIDOCAINE HYDROCHLORIDE 10 MG/ML
INJECTION, SOLUTION EPIDURAL; INFILTRATION; INTRACAUDAL; PERINEURAL
Status: DISPENSED
Start: 2024-12-10